# Patient Record
Sex: MALE | Race: WHITE | Employment: OTHER | URBAN - METROPOLITAN AREA
[De-identification: names, ages, dates, MRNs, and addresses within clinical notes are randomized per-mention and may not be internally consistent; named-entity substitution may affect disease eponyms.]

---

## 2019-01-21 ENCOUNTER — HOSPITAL ENCOUNTER (OUTPATIENT)
Age: 74
Discharge: HOME | End: 2019-01-23
Payer: MEDICARE

## 2019-01-21 LAB — PROSTATE SPECIFIC ANTIGEN: 2.67 NG/ML (ref 0–4)

## 2019-01-21 PROCEDURE — 84153 ASSAY OF PSA TOTAL: CPT

## 2019-02-25 ENCOUNTER — HOSPITAL ENCOUNTER (OUTPATIENT)
Age: 74
Discharge: HOME | End: 2019-02-27
Payer: MEDICARE

## 2019-02-25 LAB
PROSTATE SPECIFIC ANTIGEN: 2.99 NG/ML (ref 0–4)
URIC ACID, SERUM: 5.7 MG/DL (ref 3.4–7)

## 2019-02-25 PROCEDURE — 84550 ASSAY OF BLOOD/URIC ACID: CPT

## 2019-02-25 PROCEDURE — 84153 ASSAY OF PSA TOTAL: CPT

## 2019-05-20 ENCOUNTER — HOSPITAL ENCOUNTER (OUTPATIENT)
Age: 74
Discharge: HOME | End: 2019-05-22
Payer: MEDICARE

## 2019-05-20 LAB — PROSTATE SPECIFIC ANTIGEN: 2.68 NG/ML (ref 0–4)

## 2019-05-20 PROCEDURE — 84153 ASSAY OF PSA TOTAL: CPT

## 2019-09-18 ENCOUNTER — HOSPITAL ENCOUNTER (OUTPATIENT)
Dept: ULTRASOUND IMAGING | Age: 74
Discharge: HOME | End: 2019-09-20
Payer: MEDICARE

## 2019-09-18 DIAGNOSIS — G89.21 CHRONIC PAIN AFTER TRAUMATIC INJURY: ICD-10-CM

## 2019-09-18 DIAGNOSIS — T14.90XA TRAUMA: ICD-10-CM

## 2019-09-18 DIAGNOSIS — M79.89 RIGHT LEG SWELLING: ICD-10-CM

## 2019-09-18 DIAGNOSIS — M79.604 RIGHT LEG PAIN: ICD-10-CM

## 2019-09-18 PROCEDURE — 93971 EXTREMITY STUDY: CPT

## 2019-11-11 ENCOUNTER — HOSPITAL ENCOUNTER (OUTPATIENT)
Age: 74
Discharge: HOME | End: 2019-11-13
Payer: MEDICARE

## 2019-11-11 PROCEDURE — G0103 PSA SCREENING: HCPCS

## 2019-11-12 LAB — PROSTATE SPECIFIC ANTIGEN: 4 NG/ML (ref 0–4)

## 2020-05-09 ENCOUNTER — HOSPITAL ENCOUNTER (OUTPATIENT)
Age: 75
Discharge: HOME | End: 2020-05-11
Payer: MEDICARE

## 2020-05-09 PROCEDURE — U0003 INFECTIOUS AGENT DETECTION BY NUCLEIC ACID (DNA OR RNA); SEVERE ACUTE RESPIRATORY SYNDROME CORONAVIRUS 2 (SARS-COV-2) (CORONAVIRUS DISEASE [COVID-19]), AMPLIFIED PROBE TECHNIQUE, MAKING USE OF HIGH THROUGHPUT TECHNOLOGIES AS DESCRIBED BY CMS-2020-01-R: HCPCS

## 2020-05-13 LAB
SARS-COV-2: NOT DETECTED
SOURCE: NORMAL

## 2020-08-03 ENCOUNTER — HOSPITAL ENCOUNTER (OUTPATIENT)
Age: 75
Discharge: HOME | End: 2020-08-05
Payer: MEDICARE

## 2020-08-03 PROCEDURE — 84153 ASSAY OF PSA TOTAL: CPT

## 2020-08-03 PROCEDURE — G0103 PSA SCREENING: HCPCS

## 2020-08-13 LAB
PROSTATE SPECIFIC ANTIGEN: 4.85 NG/ML (ref 0–4)
PROSTATE SPECIFIC ANTIGEN: ABNORMAL NG/ML (ref 0–4)

## 2020-11-20 ENCOUNTER — HOSPITAL ENCOUNTER (EMERGENCY)
Age: 75
Discharge: HOME | End: 2020-11-21
Attending: EMERGENCY MEDICINE
Payer: MEDICARE

## 2020-11-20 ENCOUNTER — APPOINTMENT (OUTPATIENT)
Dept: GENERAL RADIOLOGY | Age: 75
End: 2020-11-20
Payer: MEDICARE

## 2020-11-20 VITALS
HEIGHT: 74 IN | OXYGEN SATURATION: 96 % | SYSTOLIC BLOOD PRESSURE: 152 MMHG | WEIGHT: 240 LBS | HEART RATE: 75 BPM | DIASTOLIC BLOOD PRESSURE: 74 MMHG | TEMPERATURE: 98.5 F | BODY MASS INDEX: 30.8 KG/M2 | RESPIRATION RATE: 18 BRPM

## 2020-11-20 LAB
ALBUMIN SERPL-MCNC: 3.6 G/DL (ref 3.5–5.2)
ALP BLD-CCNC: 60 U/L (ref 40–129)
ALT SERPL-CCNC: 9 U/L (ref 0–40)
ANION GAP SERPL CALCULATED.3IONS-SCNC: 10 MMOL/L (ref 7–16)
AST SERPL-CCNC: 20 U/L (ref 0–39)
BASOPHILS ABSOLUTE: 0.01 E9/L (ref 0–0.2)
BASOPHILS RELATIVE PERCENT: 0.2 % (ref 0–2)
BILIRUB SERPL-MCNC: 0.7 MG/DL (ref 0–1.2)
BUN BLDV-MCNC: 12 MG/DL (ref 8–23)
CALCIUM SERPL-MCNC: 8 MG/DL (ref 8.6–10.2)
CHLORIDE BLD-SCNC: 98 MMOL/L (ref 98–107)
CO2: 25 MMOL/L (ref 22–29)
CREAT SERPL-MCNC: 1.1 MG/DL (ref 0.7–1.2)
D DIMER: <200 NG/ML DDU
EOSINOPHILS ABSOLUTE: 0.03 E9/L (ref 0.05–0.5)
EOSINOPHILS RELATIVE PERCENT: 0.6 % (ref 0–6)
GFR AFRICAN AMERICAN: >60
GFR NON-AFRICAN AMERICAN: >60 ML/MIN/1.73
GLUCOSE BLD-MCNC: 106 MG/DL (ref 74–99)
HCT VFR BLD CALC: 41.1 % (ref 37–54)
HEMOGLOBIN: 13.8 G/DL (ref 12.5–16.5)
IMMATURE GRANULOCYTES #: 0.02 E9/L
IMMATURE GRANULOCYTES %: 0.4 % (ref 0–5)
LYMPHOCYTES ABSOLUTE: 0.69 E9/L (ref 1.5–4)
LYMPHOCYTES RELATIVE PERCENT: 14.6 % (ref 20–42)
MCH RBC QN AUTO: 30.3 PG (ref 26–35)
MCHC RBC AUTO-ENTMCNC: 33.6 % (ref 32–34.5)
MCV RBC AUTO: 90.1 FL (ref 80–99.9)
MONOCYTES ABSOLUTE: 0.5 E9/L (ref 0.1–0.95)
MONOCYTES RELATIVE PERCENT: 10.6 % (ref 2–12)
NEUTROPHILS ABSOLUTE: 3.48 E9/L (ref 1.8–7.3)
NEUTROPHILS RELATIVE PERCENT: 73.6 % (ref 43–80)
PDW BLD-RTO: 12.7 FL (ref 11.5–15)
PLATELET # BLD: 187 E9/L (ref 130–450)
PMV BLD AUTO: 10.3 FL (ref 7–12)
POTASSIUM REFLEX MAGNESIUM: 4 MMOL/L (ref 3.5–5)
RBC # BLD: 4.56 E12/L (ref 3.8–5.8)
SODIUM BLD-SCNC: 133 MMOL/L (ref 132–146)
TOTAL PROTEIN: 6.1 G/DL (ref 6.4–8.3)
TROPONIN: <0.01 NG/ML (ref 0–0.03)
WBC # BLD: 4.7 E9/L (ref 4.5–11.5)

## 2020-11-20 PROCEDURE — 93005 ELECTROCARDIOGRAM TRACING: CPT

## 2020-11-20 PROCEDURE — 85025 COMPLETE CBC W/AUTO DIFF WBC: CPT

## 2020-11-20 PROCEDURE — 80053 COMPREHEN METABOLIC PANEL: CPT

## 2020-11-20 PROCEDURE — 99283 EMERGENCY DEPT VISIT LOW MDM: CPT

## 2020-11-20 PROCEDURE — 2580000003 HC RX 258: Performed by: EMERGENCY MEDICINE

## 2020-11-20 PROCEDURE — 71045 X-RAY EXAM CHEST 1 VIEW: CPT

## 2020-11-20 PROCEDURE — 85378 FIBRIN DEGRADE SEMIQUANT: CPT

## 2020-11-20 PROCEDURE — 82728 ASSAY OF FERRITIN: CPT

## 2020-11-20 PROCEDURE — 85651 RBC SED RATE NONAUTOMATED: CPT

## 2020-11-20 PROCEDURE — 84484 ASSAY OF TROPONIN QUANT: CPT

## 2020-11-20 PROCEDURE — 86140 C-REACTIVE PROTEIN: CPT

## 2020-11-21 LAB
C-REACTIVE PROTEIN: 3.4 MG/DL (ref 0–0.4)
FERRITIN: 192 NG/ML
SEDIMENTATION RATE, ERYTHROCYTE: 13 MM/HR (ref 0–15)

## 2020-11-21 RX ORDER — ALBUTEROL SULFATE 90 UG/1
2 AEROSOL, METERED RESPIRATORY (INHALATION) 4 TIMES DAILY PRN
Qty: 1 INHALER | Refills: 0 | Status: SHIPPED | OUTPATIENT
Start: 2020-11-21 | End: 2020-12-10 | Stop reason: CLARIF

## 2020-11-21 ASSESSMENT — ENCOUNTER SYMPTOMS
SORE THROAT: 0
COUGH: 0
EYE DISCHARGE: 0
SINUS PRESSURE: 0
EYE PAIN: 0
ABDOMINAL PAIN: 0
VOMITING: 0
DIARRHEA: 0
NAUSEA: 0
EYE REDNESS: 0
WHEEZING: 0
BACK PAIN: 0
SHORTNESS OF BREATH: 0

## 2020-11-21 NOTE — ED NOTES
LPN walking pt to room with pulse ox, pulse ox maintained pulse ox of 94% RA and pulse of [facility]     Art Echavarria RN  11/20/20 9964

## 2020-11-21 NOTE — ED PROVIDER NOTES
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 History comes primarily from the patient. He states that he was diagnosed last week, and has had a significant worsening of his fatigue, myalgias, arthralgias since that time. He denies any significant shortness of breath, however as his symptoms are worsening he contacted a colleague, who advised that he present to the emergency department for further evaluation and treatment. On arrival, he is assessed with history, physical exam, imaging studies, laboratory studies, EKG, vital signs. His vital signs are stable on arrival, his O2 sat was 96% on room air, and he was afebrile. He was ambulated shortly after admission and his O2 saturation did not drop below 96% with ambulation. Review of Systems   Constitutional: Positive for activity change, appetite change, chills, fatigue and unexpected weight change (Patient endorses 9 pounds of weight loss secondary to decreased oral intake). Negative for fever. HENT: Negative for ear pain, sinus pressure and sore throat. Eyes: Negative for pain, discharge and redness. Respiratory: Negative for cough, shortness of breath and wheezing. Cardiovascular: Negative for chest pain. Gastrointestinal: Negative for abdominal pain, diarrhea, nausea and vomiting. Genitourinary: Negative for dysuria and frequency. Musculoskeletal: Positive for arthralgias and myalgias. Negative for back pain. Skin: Negative for rash and wound. Neurological: Negative for weakness and headaches. Hematological: Negative for adenopathy. All other systems reviewed and are negative. Physical Exam  Constitutional:       General: He is not in acute distress. Appearance: He is well-developed. He is not ill-appearing or diaphoretic. HENT:      Head: Normocephalic and atraumatic. Mouth/Throat:      Dentition: Abnormal dentition. Eyes:      Pupils: Pupils are equal, round, and reactive to light. Neck:      Musculoskeletal: Normal range of motion. Vascular: No JVD. Trachea: No tracheal deviation. Cardiovascular:      Rate and Rhythm: Regular rhythm. Heart sounds: No murmur. No friction rub. No gallop. Pulmonary:      Effort: Pulmonary effort is normal. No respiratory distress. Breath sounds: Normal breath sounds. No stridor. No wheezing or rales. Chest:      Chest wall: No tenderness. Abdominal:      General: Bowel sounds are normal. There is no distension. Palpations: Abdomen is soft. Tenderness: There is no abdominal tenderness. There is no guarding. Musculoskeletal: Normal range of motion. Skin:     General: Skin is warm and dry. Neurological:      Mental Status: He is alert. Cranial Nerves: No cranial nerve deficit. Psychiatric:         Behavior: Behavior normal.          Procedures     MDM  Number of Diagnoses or Management Options  Diagnosis management comments: The patient's emergency department work-up including history, physical exam, vital signs, laboratory studies, imaging studies and reevaluation after initial treatment are reassuring for discharge to home with close outpatient follow-up. Specifically, Dr. Juan Zuniga assessment was negative for any significant laboratory abnormality, his O2 saturation remained in the mid to high 90s throughout the entirety of his emergency department stay both with rest and ambulation, the remainder of his vital signs remained stable, and he was considered stable for discharge to home for convalescence. They were advised to return to the emergency department should they develop fever, chills, night sweats, nausea, vomiting, diarrhea, chest pain, shortness of breath, or worsening of their symptoms despite treatment from this emergency department visit. They were instructed to follow-up with their primary care provider in 2 days.  This information was relayed to the patient who understood this plan of care and was amenable to the plan.            --------------------------------------------- PAST HISTORY ---------------------------------------------  Past Medical History:  has a past medical history of BPH (benign prostatic hyperplasia), Cardiomyopathy ([facility] [address] Hyperlipidemia, Hypertension, Left ventricular diastolic dysfunction, and Syncope and collapse. Past Surgical History:  has no past surgical history on file. Social History:  reports that he has never smoked. He has never used smokeless tobacco. He reports current alcohol use. He reports that he does not use drugs. Family History: family history is not on file. The patients home medications have been reviewed. Allergies: Patient has no known allergies.     -------------------------------------------------- RESULTS -------------------------------------------------  Labs:  Results for orders placed or performed during the hospital encounter of 11/20/20   CBC Auto Differential   Result Value Ref Range    WBC 4.7 4.5 - 11.5 E9/L    RBC 4.56 3.80 - 5.80 E12/L    Hemoglobin 13.8 12.5 - 16.5 g/dL    Hematocrit 41.1 37.0 - 54.0 %    MCV 90.1 80.0 - 99.9 fL    MCH 30.3 26.0 - 35.0 pg    MCHC 33.6 32.0 - 34.5 %    RDW 12.7 11.5 - 15.0 fL    Platelets 724 680 - 755 E9/L    MPV 10.3 7.0 - 12.0 fL    Neutrophils % 73.6 43.0 - 80.0 %    Immature Granulocytes % 0.4 0.0 - 5.0 %    Lymphocytes % 14.6 (L) 20.0 - 42.0 %    Monocytes % 10.6 2.0 - 12.0 %    Eosinophils % 0.6 0.0 - 6.0 %    Basophils % 0.2 0.0 - 2.0 %    Neutrophils Absolute 3.48 1.80 - 7.30 E9/L    Immature Granulocytes # 0.02 E9/L    Lymphocytes Absolute 0.69 (L) 1.50 - 4.00 E9/L    Monocytes Absolute 0.50 0.10 - 0.95 E9/L    Eosinophils Absolute 0.03 (L) 0.05 - 0.50 E9/L    Basophils Absolute 0.01 0.00 - 0.20 E9/L   Comprehensive Metabolic Panel w/ Reflex to MG   Result Value Ref Range    Sodium 133 132 - 146 mmol/L    Potassium reflex Magnesium 4.0 3.5 - 5.0 mmol/L    Chloride 98 98 - 107 mmol/L    CO2 25 22 - 29 mmol/L    Anion Gap 10 7 - 16 mmol/L    Glucose 106 (H) 74 - 99 mg/dL    BUN 12 8 - 23 mg/dL    CREATININE 1.1 0.7 - 1.2 mg/dL    GFR Non-African American >60 >=60 mL/min/1.73    GFR African American >60     Calcium 8.0 (L) 8.6 - 10.2 mg/dL    Total Protein 6.1 (L) 6.4 - 8.3 g/dL    Alb 3.6 3.5 - 5.2 g/dL    Total Bilirubin 0.7 0.0 - 1.2 mg/dL    Alkaline Phosphatase 60 40 - 129 U/L    ALT 9 0 - 40 U/L    AST 20 0 - 39 U/L   Sedimentation Rate   Result Value Ref Range    Sed Rate 13 0 - 15 mm/Hr   Troponin   Result Value Ref Range    Troponin <0.01 0.00 - 0.03 ng/mL   D-dimer, quantitative   Result Value Ref Range    D-Dimer, Quant <200 ng/mL DDU       Radiology:  XR CHEST PORTABLE   Final Result   No acute process. ------------------------- NURSING NOTES AND VITALS REVIEWED ---------------------------  Date / Time Roomed:  11/20/2020  9:49 PM  ED Bed Assignment:  06/06    The nursing notes within the ED encounter and vital signs as below have been reviewed. BP (!) 152/74   Pulse 75   Temp 98.5 °F (36.9 °C) (Oral)   Resp 18   Ht 6' 2\" (1.88 m)   Wt 240 lb (108.9 kg)   SpO2 96%   BMI 30.81 kg/m²   Oxygen Saturation Interpretation: Normal      ------------------------------------------ PROGRESS NOTES ------------------------------------------  11:48 PM EST  I have spoken with the patient and discussed todays results, in addition to providing specific details for the plan of care and counseling regarding the diagnosis and prognosis. Their questions are answered at this time and they are agreeable with the plan. I discussed at length with them reasons for immediate return here for re evaluation.  They will followup with their primary care physician by calling their office on Monday.      --------------------------------- ADDITIONAL PROVIDER NOTES ---------------------------------  At this time the patient is without objective evidence of an acute process requiring hospitalization or inpatient management. They have remained hemodynamically stable throughout their entire ED visit and are stable for discharge with outpatient follow-up. The plan has been discussed in detail and they are aware of the specific conditions for emergent return, as well as the importance of follow-up. Discharge Medication List as of 11/21/2020 12:36 AM      START taking these medications    Details   albuterol sulfate HFA (VENTOLIN HFA) 108 (90 Base) MCG/ACT inhaler Inhale 2 puffs into the lungs 4 times daily as needed for Wheezing, Disp-1 Inhaler,R-0Print             Diagnosis:  1. COVID-19 virus infection        Disposition:  Patient's disposition: Discharge to home  Patient's condition is stable.                  [address]ake [address] DO  Resident  11/21/20 9645

## 2020-11-23 LAB
EKG ATRIAL RATE: 87 BPM
EKG P AXIS: 26 DEGREES
EKG P-R INTERVAL: 212 MS
EKG Q-T INTERVAL: 358 MS
EKG QRS DURATION: 88 MS
EKG QTC CALCULATION (BAZETT): 430 MS
EKG R AXIS: 26 DEGREES
EKG T AXIS: 19 DEGREES
EKG VENTRICULAR RATE: 87 BPM

## 2020-11-27 ENCOUNTER — HOSPITAL ENCOUNTER (INPATIENT)
Age: 75
LOS: 2 days | Discharge: HOME | DRG: 177 | End: 2020-11-29
Attending: EMERGENCY MEDICINE | Admitting: INTERNAL MEDICINE
Payer: MEDICARE

## 2020-11-27 ENCOUNTER — APPOINTMENT (OUTPATIENT)
Dept: GENERAL RADIOLOGY | Age: 75
DRG: 177 | End: 2020-11-27
Payer: MEDICARE

## 2020-11-27 ENCOUNTER — APPOINTMENT (OUTPATIENT)
Dept: CT IMAGING | Age: 75
DRG: 177 | End: 2020-11-27
Payer: MEDICARE

## 2020-11-27 PROBLEM — J12.82 PNEUMONIA DUE TO COVID-19 VIRUS: Status: ACTIVE | Noted: 2020-11-27

## 2020-11-27 PROBLEM — U07.1 COVID-19 VIRUS INFECTION: Status: ACTIVE | Noted: 2020-11-27

## 2020-11-27 LAB
ALBUMIN SERPL-MCNC: 3.4 G/DL (ref 3.5–5.2)
ALP BLD-CCNC: 53 U/L (ref 40–129)
ALT SERPL-CCNC: 15 U/L (ref 0–40)
ANION GAP SERPL CALCULATED.3IONS-SCNC: 15 MMOL/L (ref 7–16)
APTT: 25.3 SEC (ref 24.5–35.1)
AST SERPL-CCNC: 25 U/L (ref 0–39)
BASOPHILS ABSOLUTE: 0.01 E9/L (ref 0–0.2)
BASOPHILS RELATIVE PERCENT: 0.1 % (ref 0–2)
BILIRUB SERPL-MCNC: 0.9 MG/DL (ref 0–1.2)
BUN BLDV-MCNC: 18 MG/DL (ref 8–23)
C-REACTIVE PROTEIN: 7.3 MG/DL (ref 0–0.4)
CALCIUM SERPL-MCNC: 8.7 MG/DL (ref 8.6–10.2)
CHLORIDE BLD-SCNC: 105 MMOL/L (ref 98–107)
CO2: 21 MMOL/L (ref 22–29)
CREAT SERPL-MCNC: 1 MG/DL (ref 0.7–1.2)
D DIMER: 423 NG/ML DDU
EOSINOPHILS ABSOLUTE: 0.01 E9/L (ref 0.05–0.5)
EOSINOPHILS RELATIVE PERCENT: 0.1 % (ref 0–6)
FERRITIN: 383 NG/ML
FIBRINOGEN: 596 MG/DL (ref 225–540)
GFR AFRICAN AMERICAN: >60
GFR NON-AFRICAN AMERICAN: >60 ML/MIN/1.73
GLUCOSE BLD-MCNC: 108 MG/DL (ref 74–99)
HCT VFR BLD CALC: 42 % (ref 37–54)
HEMOGLOBIN: 13.9 G/DL (ref 12.5–16.5)
IMMATURE GRANULOCYTES #: 0.1 E9/L
IMMATURE GRANULOCYTES %: 0.8 % (ref 0–5)
INR BLD: 1.1
LACTATE DEHYDROGENASE: 284 U/L (ref 135–225)
LACTIC ACID: 1.5 MMOL/L (ref 0.5–2.2)
LYMPHOCYTES ABSOLUTE: 0.89 E9/L (ref 1.5–4)
LYMPHOCYTES RELATIVE PERCENT: 7.4 % (ref 20–42)
MCH RBC QN AUTO: 29.1 PG (ref 26–35)
MCHC RBC AUTO-ENTMCNC: 33.1 % (ref 32–34.5)
MCV RBC AUTO: 87.9 FL (ref 80–99.9)
METER GLUCOSE: 104 MG/DL (ref 74–99)
METER GLUCOSE: 115 MG/DL (ref 74–99)
MONOCYTES ABSOLUTE: 1.27 E9/L (ref 0.1–0.95)
MONOCYTES RELATIVE PERCENT: 10.5 % (ref 2–12)
NEUTROPHILS ABSOLUTE: 9.79 E9/L (ref 1.8–7.3)
NEUTROPHILS RELATIVE PERCENT: 81.1 % (ref 43–80)
PDW BLD-RTO: 12.6 FL (ref 11.5–15)
PLATELET # BLD: 324 E9/L (ref 130–450)
PMV BLD AUTO: 10 FL (ref 7–12)
POTASSIUM REFLEX MAGNESIUM: 4.3 MMOL/L (ref 3.5–5)
PROCALCITONIN: 0.13 NG/ML (ref 0–0.08)
PROTHROMBIN TIME: 12.2 SEC (ref 9.3–12.4)
RBC # BLD: 4.78 E12/L (ref 3.8–5.8)
SEDIMENTATION RATE, ERYTHROCYTE: 28 MM/HR (ref 0–15)
SODIUM BLD-SCNC: 141 MMOL/L (ref 132–146)
TOTAL PROTEIN: 6.3 G/DL (ref 6.4–8.3)
TROPONIN: <0.01 NG/ML (ref 0–0.03)
WBC # BLD: 12.1 E9/L (ref 4.5–11.5)

## 2020-11-27 PROCEDURE — 84145 PROCALCITONIN (PCT): CPT

## 2020-11-27 PROCEDURE — 85025 COMPLETE CBC W/AUTO DIFF WBC: CPT

## 2020-11-27 PROCEDURE — 86140 C-REACTIVE PROTEIN: CPT

## 2020-11-27 PROCEDURE — 83605 ASSAY OF LACTIC ACID: CPT

## 2020-11-27 PROCEDURE — 6360000002 HC RX W HCPCS: Performed by: INTERNAL MEDICINE

## 2020-11-27 PROCEDURE — 2060000000 HC ICU INTERMEDIATE R&B

## 2020-11-27 PROCEDURE — 85384 FIBRINOGEN ACTIVITY: CPT

## 2020-11-27 PROCEDURE — 83615 LACTATE (LD) (LDH) ENZYME: CPT

## 2020-11-27 PROCEDURE — 82962 GLUCOSE BLOOD TEST: CPT

## 2020-11-27 PROCEDURE — 71045 X-RAY EXAM CHEST 1 VIEW: CPT

## 2020-11-27 PROCEDURE — 82728 ASSAY OF FERRITIN: CPT

## 2020-11-27 PROCEDURE — 71275 CT ANGIOGRAPHY CHEST: CPT

## 2020-11-27 PROCEDURE — 85378 FIBRIN DEGRADE SEMIQUANT: CPT

## 2020-11-27 PROCEDURE — 6370000000 HC RX 637 (ALT 250 FOR IP): Performed by: EMERGENCY MEDICINE

## 2020-11-27 PROCEDURE — 84154 ASSAY OF PSA FREE: CPT

## 2020-11-27 PROCEDURE — 84484 ASSAY OF TROPONIN QUANT: CPT

## 2020-11-27 PROCEDURE — 2580000003 HC RX 258: Performed by: SPECIALIST

## 2020-11-27 PROCEDURE — 85610 PROTHROMBIN TIME: CPT

## 2020-11-27 PROCEDURE — 6370000000 HC RX 637 (ALT 250 FOR IP): Performed by: INTERNAL MEDICINE

## 2020-11-27 PROCEDURE — 2580000003 HC RX 258: Performed by: INTERNAL MEDICINE

## 2020-11-27 PROCEDURE — 85651 RBC SED RATE NONAUTOMATED: CPT

## 2020-11-27 PROCEDURE — 6360000004 HC RX CONTRAST MEDICATION: Performed by: RADIOLOGY

## 2020-11-27 PROCEDURE — 99285 EMERGENCY DEPT VISIT HI MDM: CPT

## 2020-11-27 PROCEDURE — 85730 THROMBOPLASTIN TIME PARTIAL: CPT

## 2020-11-27 PROCEDURE — 2500000003 HC RX 250 WO HCPCS: Performed by: SPECIALIST

## 2020-11-27 PROCEDURE — 84153 ASSAY OF PSA TOTAL: CPT

## 2020-11-27 PROCEDURE — 2580000003 HC RX 258: Performed by: RADIOLOGY

## 2020-11-27 PROCEDURE — 80053 COMPREHEN METABOLIC PANEL: CPT

## 2020-11-27 PROCEDURE — XW033E5 INTRODUCTION OF REMDESIVIR ANTI-INFECTIVE INTO PERIPHERAL VEIN, PERCUTANEOUS APPROACH, NEW TECHNOLOGY GROUP 5: ICD-10-PCS | Performed by: EMERGENCY MEDICINE

## 2020-11-27 RX ORDER — METOPROLOL SUCCINATE 25 MG/1
25 TABLET, EXTENDED RELEASE ORAL DAILY
Status: DISCONTINUED | OUTPATIENT
Start: 2020-11-28 | End: 2020-11-29 | Stop reason: HOSPADM

## 2020-11-27 RX ORDER — DEXTROSE MONOHYDRATE 25 G/50ML
12.5 INJECTION, SOLUTION INTRAVENOUS PRN
Status: DISCONTINUED | OUTPATIENT
Start: 2020-11-27 | End: 2020-11-27

## 2020-11-27 RX ORDER — 0.9 % SODIUM CHLORIDE 0.9 %
30 INTRAVENOUS SOLUTION INTRAVENOUS PRN
Status: DISCONTINUED | OUTPATIENT
Start: 2020-11-27 | End: 2020-11-29 | Stop reason: HOSPADM

## 2020-11-27 RX ORDER — ASPIRIN 81 MG/1
81 TABLET ORAL DAILY
Status: DISCONTINUED | OUTPATIENT
Start: 2020-11-27 | End: 2020-11-29 | Stop reason: HOSPADM

## 2020-11-27 RX ORDER — CHOLECALCIFEROL (VITAMIN D3) 50 MCG
6000 TABLET ORAL DAILY
Status: DISCONTINUED | OUTPATIENT
Start: 2020-11-27 | End: 2020-11-29 | Stop reason: HOSPADM

## 2020-11-27 RX ORDER — SODIUM CHLORIDE 0.9 % (FLUSH) 0.9 %
10 SYRINGE (ML) INJECTION ONCE
Status: COMPLETED | OUTPATIENT
Start: 2020-11-27 | End: 2020-11-27

## 2020-11-27 RX ORDER — NICOTINE POLACRILEX 4 MG
15 LOZENGE BUCCAL PRN
Status: DISCONTINUED | OUTPATIENT
Start: 2020-11-27 | End: 2020-11-27

## 2020-11-27 RX ORDER — METOPROLOL SUCCINATE 25 MG/1
25 TABLET, EXTENDED RELEASE ORAL DAILY
COMMUNITY

## 2020-11-27 RX ORDER — ZOLPIDEM TARTRATE 5 MG/1
5 TABLET ORAL NIGHTLY PRN
Status: DISCONTINUED | OUTPATIENT
Start: 2020-11-27 | End: 2020-11-29 | Stop reason: HOSPADM

## 2020-11-27 RX ORDER — ACETAMINOPHEN 650 MG/1
650 SUPPOSITORY RECTAL EVERY 6 HOURS PRN
Status: DISCONTINUED | OUTPATIENT
Start: 2020-11-27 | End: 2020-11-29 | Stop reason: HOSPADM

## 2020-11-27 RX ORDER — PANTOPRAZOLE SODIUM 40 MG/1
40 TABLET, DELAYED RELEASE ORAL
Status: DISCONTINUED | OUTPATIENT
Start: 2020-11-28 | End: 2020-11-29 | Stop reason: HOSPADM

## 2020-11-27 RX ORDER — TAMSULOSIN HYDROCHLORIDE 0.4 MG/1
0.4 CAPSULE ORAL 2 TIMES DAILY
Status: DISCONTINUED | OUTPATIENT
Start: 2020-11-27 | End: 2020-11-29 | Stop reason: HOSPADM

## 2020-11-27 RX ORDER — ACETAMINOPHEN 650 MG/1
650 SUPPOSITORY RECTAL EVERY 6 HOURS PRN
Status: DISCONTINUED | OUTPATIENT
Start: 2020-11-27 | End: 2020-11-27 | Stop reason: SDUPTHER

## 2020-11-27 RX ORDER — ATORVASTATIN CALCIUM 10 MG/1
10 TABLET, FILM COATED ORAL DAILY
Status: DISCONTINUED | OUTPATIENT
Start: 2020-11-27 | End: 2020-11-29 | Stop reason: HOSPADM

## 2020-11-27 RX ORDER — PROMETHAZINE HYDROCHLORIDE 25 MG/1
12.5 TABLET ORAL EVERY 6 HOURS PRN
Status: DISCONTINUED | OUTPATIENT
Start: 2020-11-27 | End: 2020-11-29 | Stop reason: HOSPADM

## 2020-11-27 RX ORDER — ONDANSETRON 2 MG/ML
4 INJECTION INTRAMUSCULAR; INTRAVENOUS EVERY 6 HOURS PRN
Status: DISCONTINUED | OUTPATIENT
Start: 2020-11-27 | End: 2020-11-29 | Stop reason: HOSPADM

## 2020-11-27 RX ORDER — DEXAMETHASONE 4 MG/1
6 TABLET ORAL EVERY 12 HOURS SCHEDULED
Status: DISCONTINUED | OUTPATIENT
Start: 2020-11-27 | End: 2020-11-29 | Stop reason: HOSPADM

## 2020-11-27 RX ORDER — POLYETHYLENE GLYCOL 3350 17 G/17G
17 POWDER, FOR SOLUTION ORAL DAILY PRN
Status: DISCONTINUED | OUTPATIENT
Start: 2020-11-27 | End: 2020-11-29 | Stop reason: HOSPADM

## 2020-11-27 RX ORDER — DEXTROSE MONOHYDRATE 50 MG/ML
100 INJECTION, SOLUTION INTRAVENOUS PRN
Status: DISCONTINUED | OUTPATIENT
Start: 2020-11-27 | End: 2020-11-27

## 2020-11-27 RX ORDER — SODIUM CHLORIDE 0.9 % (FLUSH) 0.9 %
10 SYRINGE (ML) INJECTION PRN
Status: DISCONTINUED | OUTPATIENT
Start: 2020-11-27 | End: 2020-11-29 | Stop reason: HOSPADM

## 2020-11-27 RX ORDER — ACETAMINOPHEN 325 MG/1
650 TABLET ORAL EVERY 6 HOURS PRN
Status: DISCONTINUED | OUTPATIENT
Start: 2020-11-27 | End: 2020-11-29 | Stop reason: HOSPADM

## 2020-11-27 RX ORDER — SODIUM CHLORIDE 0.9 % (FLUSH) 0.9 %
10 SYRINGE (ML) INJECTION EVERY 12 HOURS SCHEDULED
Status: DISCONTINUED | OUTPATIENT
Start: 2020-11-27 | End: 2020-11-29 | Stop reason: HOSPADM

## 2020-11-27 RX ORDER — OMEPRAZOLE 40 MG/1
40 CAPSULE, DELAYED RELEASE ORAL DAILY PRN
COMMUNITY

## 2020-11-27 RX ORDER — LOSARTAN POTASSIUM 25 MG/1
12.5 TABLET ORAL DAILY
Status: DISCONTINUED | OUTPATIENT
Start: 2020-11-27 | End: 2020-11-29 | Stop reason: HOSPADM

## 2020-11-27 RX ORDER — ACETAMINOPHEN 325 MG/1
650 TABLET ORAL EVERY 6 HOURS PRN
Status: DISCONTINUED | OUTPATIENT
Start: 2020-11-27 | End: 2020-11-27 | Stop reason: SDUPTHER

## 2020-11-27 RX ORDER — DEXAMETHASONE 6 MG/1
6 TABLET ORAL DAILY
Status: ON HOLD | COMMUNITY
Start: 2020-11-23 | End: 2020-11-29 | Stop reason: SDUPTHER

## 2020-11-27 RX ADMIN — LOSARTAN POTASSIUM 12.5 MG: 25 TABLET, FILM COATED ORAL at 18:13

## 2020-11-27 RX ADMIN — IOPAMIDOL 75 ML: 755 INJECTION, SOLUTION INTRAVENOUS at 15:39

## 2020-11-27 RX ADMIN — Medication 10 ML: at 23:50

## 2020-11-27 RX ADMIN — DEXAMETHASONE 6 MG: 4 TABLET ORAL at 21:13

## 2020-11-27 RX ADMIN — ASPIRIN 81 MG: 81 TABLET, COATED ORAL at 21:10

## 2020-11-27 RX ADMIN — REMDESIVIR 200 MG: 100 INJECTION, POWDER, LYOPHILIZED, FOR SOLUTION INTRAVENOUS at 18:19

## 2020-11-27 RX ADMIN — ATORVASTATIN CALCIUM 10 MG: 20 TABLET, FILM COATED ORAL at 21:09

## 2020-11-27 RX ADMIN — ZOLPIDEM TARTRATE 5 MG: 5 TABLET ORAL at 23:48

## 2020-11-27 RX ADMIN — ENOXAPARIN SODIUM 40 MG: 40 INJECTION SUBCUTANEOUS at 18:15

## 2020-11-27 RX ADMIN — TAMSULOSIN HYDROCHLORIDE 0.4 MG: 0.4 CAPSULE ORAL at 21:09

## 2020-11-27 RX ADMIN — SODIUM CHLORIDE, PRESERVATIVE FREE 10 ML: 5 INJECTION INTRAVENOUS at 18:23

## 2020-11-27 RX ADMIN — Medication 6000 UNITS: at 18:13

## 2020-11-27 RX ADMIN — ACETAMINOPHEN 650 MG: 325 TABLET ORAL at 12:07

## 2020-11-27 ASSESSMENT — PAIN SCALES - GENERAL
PAINLEVEL_OUTOF10: 0

## 2020-11-27 NOTE — ED PROVIDER NOTES
Department of Emergency Medicine   ED  Provider Note  Admit Date/RoomTime: 11/27/2020 11:24 AM  ED Room: 13/13          History of Present Illness:  11/27/20, Time: 11:54 AM EST  Chief Complaint   Patient presents with    Concern For COVID-19     test positive 1 week ago, still feels sick, not eating or drinking well. States he lost 12#. Rosalie Chavez is a 76 y.o. male presenting to the ED for shortness of breath and covid-19 infection, beginning almost 2 weeks ago. The complaint has been persistent, moderate in severity, and worsened by exertion. Patient reports he was diagnosed with coronavirus over a week ago, he reports that he feels sick and is having trouble eating and drinking. He is lost almost 12 pounds. He reports that he is exertionally fatigued and exertionally dyspneic. He denies chest pain. No orthopnea. No diarrhea. He reports positive sick contacts. He reports his wife is also sick with coronavirus. 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 Patient was already taking Decadron at home    Review of Systems:   A complete review of systems was performed and pertinent positives and negatives are stated within HPI, all other systems reviewed and are negative.        --------------------------------------------- PAST HISTORY ---------------------------------------------  Past Medical History:  has a past medical history of BPH (benign prostatic hyperplasia), Cardiomyopathy ([facility] [address] Hyperlipidemia, Hypertension, Left ventricular diastolic dysfunction, and Syncope and collapse. Past Surgical History:  has no past surgical history on file. Social History:  reports that he has never smoked. He has never used smokeless tobacco. He reports current alcohol use. He reports that he does not use drugs. Family History: family history is not on file. . Unless otherwise noted, family history is non contributory    The patients home medications have been reviewed.     Allergies: Patient has no known allergies. I have reviewed the past medical history, past surgical history, social history, and family history    ---------------------------------------------------PHYSICAL EXAM--------------------------------------    Constitutional/General: Alert and oriented x3  Head: Normocephalic and atraumatic  Eyes: PERRL, EOMI, sclera non icteric  Mouth: Oropharynx clear, handling secretions  Neck: Supple, full ROM, no stridor, no meningeal signs  Respiratory: Lungs crackles bilaterally at the bases. Not in respiratory distress  Cardiovascular:  Regular rate. Regular rhythm. No murmurs, no aortic murmurs, no gallops, no rubs. 2+ distal pulses. Gastrointestinal:  Abdomen Soft, Non tender, Non distended. Musculoskeletal: Moves all extremities x 4. Warm and well perfused, no clubbing, no cyanosis, no edema. Capillary refill <3 seconds  Skin: skin warm and dry. No rashes. Neurologic: GCS 15, no focal deficits          -------------------------------------------------- RESULTS -------------------------------------------------  I have personally reviewed all laboratory and imaging results for this patient. Results are listed below.      LABS: (Lab results interpreted by me)  Results for orders placed or performed during the hospital encounter of 11/27/20   Procalcitonin   Result Value Ref Range    Procalcitonin 0.13 (H) 0.00 - 0.08 ng/mL   Fibrinogen   Result Value Ref Range    Fibrinogen 596 (H) 225 - 540 mg/dL   APTT   Result Value Ref Range    aPTT 25.3 24.5 - 35.1 sec   Protime-INR   Result Value Ref Range    Protime 12.2 9.3 - 12.4 sec    INR 1.1    Comprehensive Metabolic Panel w/ Reflex to MG   Result Value Ref Range    Sodium 141 132 - 146 mmol/L    Potassium reflex Magnesium 4.3 3.5 - 5.0 mmol/L    Chloride 105 98 - 107 mmol/L    CO2 21 (L) 22 - 29 mmol/L    Anion Gap 15 7 - 16 mmol/L    Glucose 108 (H) 74 - 99 mg/dL    BUN 18 8 - 23 mg/dL    CREATININE 1.0 0.7 - 1.2 mg/dL    GFR Non-African American >60 >=60 mL/min/1.73    GFR African American >60     Calcium 8.7 8.6 - 10.2 mg/dL    Total Protein 6.3 (L) 6.4 - 8.3 g/dL    Alb 3.4 (L) 3.5 - 5.2 g/dL    Total Bilirubin 0.9 0.0 - 1.2 mg/dL    Alkaline Phosphatase 53 40 - 129 U/L    ALT 15 0 - 40 U/L    AST 25 0 - 39 U/L   CBC Auto Differential   Result Value Ref Range    WBC 12.1 (H) 4.5 - 11.5 E9/L    RBC 4.78 3.80 - 5.80 E12/L    Hemoglobin 13.9 12.5 - 16.5 g/dL    Hematocrit 42.0 37.0 - 54.0 %    MCV 87.9 80.0 - 99.9 fL    MCH 29.1 26.0 - 35.0 pg    MCHC 33.1 32.0 - 34.5 %    RDW 12.6 11.5 - 15.0 fL    Platelets 592 783 - 353 E9/L    MPV 10.0 7.0 - 12.0 fL    Neutrophils % 81.1 (H) 43.0 - 80.0 %    Immature Granulocytes % 0.8 0.0 - 5.0 %    Lymphocytes % 7.4 (L) 20.0 - 42.0 %    Monocytes % 10.5 2.0 - 12.0 %    Eosinophils % 0.1 0.0 - 6.0 %    Basophils % 0.1 0.0 - 2.0 %    Neutrophils Absolute 9.79 (H) 1.80 - 7.30 E9/L    Immature Granulocytes # 0.10 E9/L    Lymphocytes Absolute 0.89 (L) 1.50 - 4.00 E9/L    Monocytes Absolute 1.27 (H) 0.10 - 0.95 E9/L    Eosinophils Absolute 0.01 (L) 0.05 - 0.50 E9/L    Basophils Absolute 0.01 0.00 - 0.20 E9/L   C-Reactive Protein   Result Value Ref Range    CRP 7.3 (H) 0.0 - 0.4 mg/dL   Lactate Dehydrogenase   Result Value Ref Range     (H) 135 - 225 U/L   Ferritin   Result Value Ref Range    Ferritin 383 ng/mL   D-Dimer, Quantitative   Result Value Ref Range    D-Dimer, Quant 423 ng/mL DDU   Troponin   Result Value Ref Range    Troponin <0.01 0.00 - 0.03 ng/mL   Lactic Acid, Plasma   Result Value Ref Range    Lactic Acid 1.5 0.5 - 2.2 mmol/L   Sedimentation Rate   Result Value Ref Range    Sed Rate 28 (H) 0 - 15 mm/Hr   ,       RADIOLOGY:  Interpreted by Radiologist unless otherwise specified  XR CHEST PORTABLE   Final Result   Cardiomegaly. Bilateral pneumonia.       CTA PULMONARY W CONTRAST    (Results Pending)          ------------------------- NURSING NOTES AND VITALS REVIEWED ---------------------------   The range)   dexamethasone (DECADRON) tablet 6 mg (has no administration in time range)   aspirin EC tablet 81 mg (has no administration in time range)   atorvastatin (LIPITOR) tablet 10 mg (has no administration in time range)   metoprolol succinate (TOPROL XL) extended release tablet 25 mg (has no administration in time range)   losartan (COZAAR) tablet 12.5 mg (has no administration in time range)   pantoprazole (PROTONIX) tablet 40 mg (has no administration in time range)   tamsulosin (FLOMAX) capsule 0.4 mg (has no administration in time range)   sodium chloride flush 0.9 % injection 10 mL (has no administration in time range)   iopamidol (ISOVUE-370) 76 % injection 75 mL (75 mLs Intravenous Given 11/27/20 9179)              I, Dr. Sherrill Morrissey, am the primary provider of record    Medical Decision Making:   covid-19 infection  Evaluated by VINI Evans started at his request      Oxygen Saturation Interpretation: 94 % on RA. Normal      Re-Evaluations:       No change      This patient's ED course included: a personal history and physicial examination, re-evaluation prior to disposition, multiple bedside re-evaluations, IV medications, cardiac monitoring, continuous pulse oximetry and complex medical decision making and emergency management    This patient has remained hemodynamically stable during their ED course. Consultations:  Dr. Nancy Frederick    Counseling: The emergency provider has spoken with the patient and discussed todays results, in addition to providing specific details for the plan of care and counseling regarding the diagnosis and prognosis.   Questions are answered at this time and they are agreeable with the plan.       --------------------------------- IMPRESSION AND DISPOSITION ---------------------------------    IMPRESSION  1. COVID-19 virus infection        DISPOSITION  Disposition: Admit to telemetry  Patient condition is stable        NOTE: This report was transcribed using voice recognition software.  Every effort was made to ensure accuracy; however, inadvertent computerized transcription errors may be present     Hernandez Neal MD  11/27/20 3649

## 2020-11-27 NOTE — H&P
[address] [facility]  History and Physical      CHIEF COMPLAINT:    Chief Complaint   Patient presents with    Concern For COVID-19     test positive 1 week ago, still feels sick, not eating or drinking well. States he lost 12#. Patient of Samina Walker MD presents with:  Pneumonia due to COVID-19 virus    History of Present Illness:   Dr. Nuha Bingham states he has actually felt pretty sick for about 2 weeks now. 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 He reports shortness of breath and diarrhea, as well as fever in the 100s although not sure exactly how high. No other associated symptoms. No alleviating or exacerbating factors. Severity is mild. Quality is none. He was feeling just nonspecifically sicker today and was advised by infectious disease to come into the ER. In the ER he was found to have fairly significant bilateral infiltrates and multiple lab work abnormalities, although not overtly hypoxemic, I believe the lowest he got was about 94% on room air. Past Medical History:   Diagnosis Date    BPH (benign prostatic hyperplasia)     Cardiomyopathy ([facility] [address]     Hyperlipidemia     Hypertension     Left ventricular diastolic dysfunction     Syncope and collapse          History reviewed. No pertinent surgical history. Medications Prior to Admission:    Not in a hospital admission. Note that the patient's home medications were reviewed and the above list is accurate to the best of my knowledge at the time of the exam.    Allergies:    Patient has no known allergies. Social History:    reports that he has never smoked. He has never used smokeless tobacco. He reports current alcohol use. He reports that he does not use drugs.     Family History:   Wife also has [phone] although doing okay at home    REVIEW OF SYSTEMS:  As above in the HPI, otherwise negative    PHYSICAL EXAM:    Vitals:  BP (!) 161/82 Pulse 53   Temp 97.7 °F (36.5 °C) (Temporal)   Resp 19   Ht 6' 2\" (1.88 m)   Wt 240 lb (108.9 kg)   SpO2 95%   BMI 30.81 kg/m²       General appearance: NAD, conversant, overall comfortable appearing  Eyes: Sclerae anicteric  HEENT: AT/NC, MMM  Neck: FROM, supple, no thyromegaly  Lymph: No cervical / supraclavicular lymphadenopathy  Lungs: Bilateral rales, WOB normal  CV: RRR, no MRGs, no lower extremity edema  Abdomen: Soft, non-tender; no masses or HSM, +BS  Extremities: No edema. No apparent synovitis. No clubbing or cyanosis of the hands. Skin: no rash, induration, lesions, or ulcers  Psych: Calm and cooperative. Normal judgement and insight. Normal mood and affect. Neuro: Alert and interactive, face symmetric, speech fluent. LABS:  All labs reviewed. Of note:  CBC with Differential:    Lab Results   Component Value Date    WBC 12.1 11/27/2020    RBC 4.78 11/27/2020    HGB 13.9 11/27/2020    HCT 42.0 11/27/2020     11/27/2020    MCV 87.9 11/27/2020    MCH 29.1 11/27/2020    MCHC 33.1 11/27/2020    RDW 12.6 11/27/2020    LYMPHOPCT 7.4 11/27/2020    MONOPCT 10.5 11/27/2020    BASOPCT 0.1 11/27/2020    MONOSABS 1.27 11/27/2020    LYMPHSABS 0.89 11/27/2020    EOSABS 0.01 11/27/2020    BASOSABS 0.01 11/27/2020     CMP:    Lab Results   Component Value Date     11/27/2020    K 4.3 11/27/2020     11/27/2020    CO2 21 11/27/2020    BUN 18 11/27/2020    CREATININE 1.0 11/27/2020    GFRAA >60 11/27/2020    LABGLOM >60 11/27/2020    GLUCOSE 108 11/27/2020    PROT 6.3 11/27/2020    LABALBU 3.4 11/27/2020    CALCIUM 8.7 11/27/2020    BILITOT 0.9 11/27/2020    ALKPHOS 53 11/27/2020    AST 25 11/27/2020    ALT 15 11/27/2020       Imaging:  I've personally reviewed the patient's CXR:        ASSESSMENT/PLAN:  Principal Problem:    Pneumonia due to COVID-19 virus  Resolved Problems:    * No resolved hospital problems.  *      CTA chest    Borderline O2 sats (94/RA) but CXR looks pretty abnormal    Dex/rem    ID Consult    Increased dose DVT ppx due to BMI >30    Code status: Full  Requires inpatient level of care  Cristina Hinton    2:34 PM  11/27/2020  Cell: [phone]

## 2020-11-27 NOTE — LETTER
Beneficiary Notification Letter  BPCI Advanced     Your Doctor or [address],    We wanted to let you know that your health care provider, [address], has volunteered to take part in our [facility] & Medicaid Services (CMS) Bundled Payments for [facility] (BPCI Advanced). This doesnt change your Medicare rights or benefits and you dont need to do anything. What are bundled payments? A bundled payment combines, or bundles together, payments that Medicare makes to your health care providers for the many different kinds of medical services you might get in a specific time period. In BPCI Advanced, this time period could include a hospital inpatient stay or outpatient procedure, plus 90 days. Why would Medicare bundle payments? Bundled payments are thought of as a value-based way to pay because health care providers are responsible for both the quality and cost of medical care they give. This is a relatively new way of paying health care providers compared to thefee-for-service way Medicare has traditionally paid, where providers are paid separately for each service they provide. Bundled payments encourage these providers to work together to provide better, more coordinated care during your hospital stay, or outpatient procedure, and through your recovery. What does BPCI Advance mean for me? Youre more likely to get even better care when hospitals, doctors, and other health care providers work together. In BPCI Advanced, hospitals, doctors, and other health care providers may be rewarded for providing better, more coordinated health care. Medicare will watch BPCI Advanced participants closely to make sure that you and other patients keep getting efficient, high quality care. What do I need to know about BPCI Advanced? Whats most important for you to know is that your Medicare rights and benefits wont change because your health care provider is participating in [address]. Medicare will keep covering all of your medically necessary services. Even though Medicare will pay your doctor in a different way under BPCI Advanced, how much you have to pay wont change. Health care providers and suppliers who are enrolled in Medicare will submit their Medicare claims like they always have. Youll have all the same Medicare rights and protections, including the right to choose which hospital, doctor, or other health care provider you see. If you dont want to get care from a health care provider whos participating in [address], then youll have to choose a different health care provider whos not participating in the Model. How can I give feedback about my health care? Medicare might ask you to take a voluntary survey about the services and care you received from  [address] during your hospital stay or outpatient procedure and for a specific period of time afterwards. You can decide whether you want to take the voluntary survey, but if you do, itll help Medicare make BPCI Advanced and the care of other Medicare patients better. If you have concerns or complaints about your care, you can:   · Talk to your doctor or health care provider. · Contact your Beneficiary and Family Centered Care Quality Improvement   Organization [facility]). You can get your [facility]s phone number  at  Medicare.gov/contacts or by calling 1-800-MEDICARE. TTY users can call  4-655.876.4165. Where can I learn more about BPCI Advanced? Learn more about BPCI Advanced at https://innovation.cms.gov/initiatives/bpci-advanced/:  · A list of all the hospitals and physician group practices in the country participating in [address]. · All of the inpatient and outpatient Clinical Episodes that are currently included under BPCI Advanced. A Clinical Episode is a grouping of medical conditions or diagnoses that are included in the [address] [facility].

## 2020-11-27 NOTE — ACP (ADVANCE CARE PLANNING)
Advance Care Planning     Advance Care Planning Activator (Inpatient)  Conversation Note      Date of ACP Conversation: 11/27/2020    Conversation Conducted with: Patient with Decision Making Capacity    ACP Activator: Camilla Bae    *When Decision Maker makes decisions on behalf of the incapacitated patient: Decision Maker is asked to consider and make decisions based on patient values, known preferences, or best interests. Health Care Decision Maker:     Current Designated Health Care Decision Maker:   (If there is a valid [place] named in the [address] [place] [address] Makers\" box in the ACP activity, but it is not visible above, be sure to open that field and then select the health care decision maker relationship (ie \"primary\") in the blank space to the right of the name.) Validate  this information as still accurate & up-to-date; edit [place] field as needed.)    Note: Assess and validate information in current ACP documents, as indicated. If no Decision Maker listed above or available through scanned documents, then:    If no Authorized Decision Maker has previously been identified, then patient chooses [place]:  \"Who would you like to name as your primary health care decision-maker? \"               Name: Kenzie Borrego Agapito        Relationship: Wife          Phone number: [phone]  \"Can this person be reached easily? \" Yes    Note: If the relationship of these Decision-Makers to the patient does NOT follow your state's Next of Kin hierarchy, recommend that patient complete ACP document that meets state-specific requirements to allow them to act on the patient's behalf when appropriate. Care Preferences    Ventilation: \"If you were in your present state of health and suddenly became very ill and were unable to breathe on your own, what would your preference be about the use of a ventilator (breathing machine) if it were available to you? \" Would the patient desire the use of ventilator (breathing machine)?: yes    \"If your health worsens and it becomes clear that your chance of recovery is unlikely, what would your preference be about the use of a ventilator (breathing machine) if it were available to you? \"     Would the patient desire the use of ventilator (breathing machine)?: No      Resuscitation  \"CPR works best to restart the heart when there is a sudden event, like a heart attack, in someone who is otherwise healthy. Unfortunately, CPR does not typically restart the heart for people who have serious health conditions or who are very sick. \"    \"In the event your heart stopped as a result of an underlying serious health condition, would you want attempts to be made to restart your heart (answer \"yes\" for attempt to resuscitate) or would you prefer a natural death (answer \"no\" for do not attempt to resuscitate)? \" yes      NOTE: If the patient has a valid advance directive AND now provides care preference(s) that are inconsistent with that prior directive, advise the patient to consider either: creating a new advance directive that complies with state-specific requirements; or, if that is not possible, orally revoking that prior directive in accordance with state-specific requirements, which must be documented in the EHR. [] Yes   [x] No   Educated Patient / Sheldon Leys regarding differences between Advance Directives and portable DNR orders.     Length of ACP Conversation in minutes:      Conversation Outcomes:  [x] ACP discussion completed  [] Existing advance directive reviewed with patient; no changes to patient's previously recorded wishes  [] New Advance Directive completed  [] Portable Do Not Rescitate prepared for Provider review and signature  [] POLST/POST/MOLST/MOST prepared for Provider review and signature      Follow-up plan:    [] Schedule follow-up conversation to continue planning  [] Referred individual to Provider for

## 2020-11-28 LAB
ADENOVIRUS BY PCR: NOT DETECTED
ALBUMIN SERPL-MCNC: 3.2 G/DL (ref 3.5–5.2)
ALP BLD-CCNC: 47 U/L (ref 40–129)
ALT SERPL-CCNC: 18 U/L (ref 0–40)
ANION GAP SERPL CALCULATED.3IONS-SCNC: 12 MMOL/L (ref 7–16)
AST SERPL-CCNC: 28 U/L (ref 0–39)
BASOPHILS ABSOLUTE: 0.02 E9/L (ref 0–0.2)
BASOPHILS RELATIVE PERCENT: 0.2 % (ref 0–2)
BILIRUB SERPL-MCNC: 0.7 MG/DL (ref 0–1.2)
BORDETELLA PARAPERTUSSIS BY PCR: NOT DETECTED
BORDETELLA PERTUSSIS BY PCR: NOT DETECTED
BUN BLDV-MCNC: 19 MG/DL (ref 8–23)
CALCIUM SERPL-MCNC: 8.6 MG/DL (ref 8.6–10.2)
CHLAMYDOPHILIA PNEUMONIAE BY PCR: NOT DETECTED
CHLORIDE BLD-SCNC: 105 MMOL/L (ref 98–107)
CO2: 22 MMOL/L (ref 22–29)
CORONAVIRUS 229E BY PCR: NOT DETECTED
CORONAVIRUS HKU1 BY PCR: NOT DETECTED
CORONAVIRUS NL63 BY PCR: NOT DETECTED
CORONAVIRUS OC43 BY PCR: NOT DETECTED
CREAT SERPL-MCNC: 1 MG/DL (ref 0.7–1.2)
EOSINOPHILS ABSOLUTE: 0.02 E9/L (ref 0.05–0.5)
EOSINOPHILS RELATIVE PERCENT: 0.2 % (ref 0–6)
GFR AFRICAN AMERICAN: >60
GFR NON-AFRICAN AMERICAN: >60 ML/MIN/1.73
GLUCOSE BLD-MCNC: 114 MG/DL (ref 74–99)
HCT VFR BLD CALC: 41 % (ref 37–54)
HEMOGLOBIN: 13.8 G/DL (ref 12.5–16.5)
HUMAN METAPNEUMOVIRUS BY PCR: NOT DETECTED
HUMAN RHINOVIRUS/ENTEROVIRUS BY PCR: NOT DETECTED
IMMATURE GRANULOCYTES #: 0.09 E9/L
IMMATURE GRANULOCYTES %: 0.9 % (ref 0–5)
INFLUENZA A BY PCR: NOT DETECTED
INFLUENZA B BY PCR: NOT DETECTED
LYMPHOCYTES ABSOLUTE: 0.64 E9/L (ref 1.5–4)
LYMPHOCYTES RELATIVE PERCENT: 6.4 % (ref 20–42)
MCH RBC QN AUTO: 29.8 PG (ref 26–35)
MCHC RBC AUTO-ENTMCNC: 33.7 % (ref 32–34.5)
MCV RBC AUTO: 88.6 FL (ref 80–99.9)
MONOCYTES ABSOLUTE: 0.46 E9/L (ref 0.1–0.95)
MONOCYTES RELATIVE PERCENT: 4.6 % (ref 2–12)
MYCOPLASMA PNEUMONIAE BY PCR: NOT DETECTED
NEUTROPHILS ABSOLUTE: 8.76 E9/L (ref 1.8–7.3)
NEUTROPHILS RELATIVE PERCENT: 87.7 % (ref 43–80)
PARAINFLUENZA VIRUS 1 BY PCR: NOT DETECTED
PARAINFLUENZA VIRUS 2 BY PCR: NOT DETECTED
PARAINFLUENZA VIRUS 3 BY PCR: NOT DETECTED
PARAINFLUENZA VIRUS 4 BY PCR: NOT DETECTED
PDW BLD-RTO: 12.8 FL (ref 11.5–15)
PLATELET # BLD: 322 E9/L (ref 130–450)
PMV BLD AUTO: 9.8 FL (ref 7–12)
POTASSIUM REFLEX MAGNESIUM: 4.9 MMOL/L (ref 3.5–5)
RBC # BLD: 4.63 E12/L (ref 3.8–5.8)
REASON FOR REJECTION: NORMAL
REJECTED TEST: NORMAL
RESPIRATORY SYNCYTIAL VIRUS BY PCR: NOT DETECTED
SARS-COV-2, PCR: DETECTED
SODIUM BLD-SCNC: 139 MMOL/L (ref 132–146)
TOTAL PROTEIN: 5.9 G/DL (ref 6.4–8.3)
WBC # BLD: 10 E9/L (ref 4.5–11.5)

## 2020-11-28 PROCEDURE — 6370000000 HC RX 637 (ALT 250 FOR IP): Performed by: INTERNAL MEDICINE

## 2020-11-28 PROCEDURE — 0202U NFCT DS 22 TRGT SARS-COV-2: CPT

## 2020-11-28 PROCEDURE — 6360000002 HC RX W HCPCS: Performed by: INTERNAL MEDICINE

## 2020-11-28 PROCEDURE — 2580000003 HC RX 258: Performed by: INTERNAL MEDICINE

## 2020-11-28 PROCEDURE — 80053 COMPREHEN METABOLIC PANEL: CPT

## 2020-11-28 PROCEDURE — 2500000003 HC RX 250 WO HCPCS: Performed by: SPECIALIST

## 2020-11-28 PROCEDURE — 2060000000 HC ICU INTERMEDIATE R&B

## 2020-11-28 PROCEDURE — 85025 COMPLETE CBC W/AUTO DIFF WBC: CPT

## 2020-11-28 PROCEDURE — 36415 COLL VENOUS BLD VENIPUNCTURE: CPT

## 2020-11-28 PROCEDURE — 2580000003 HC RX 258: Performed by: SPECIALIST

## 2020-11-28 RX ADMIN — REMDESIVIR 100 MG: 100 INJECTION, POWDER, LYOPHILIZED, FOR SOLUTION INTRAVENOUS at 18:23

## 2020-11-28 RX ADMIN — ASPIRIN 81 MG: 81 TABLET, COATED ORAL at 09:00

## 2020-11-28 RX ADMIN — ATORVASTATIN CALCIUM 10 MG: 20 TABLET, FILM COATED ORAL at 21:33

## 2020-11-28 RX ADMIN — ZOLPIDEM TARTRATE 5 MG: 5 TABLET ORAL at 21:33

## 2020-11-28 RX ADMIN — SODIUM CHLORIDE, PRESERVATIVE FREE 10 ML: 5 INJECTION INTRAVENOUS at 19:17

## 2020-11-28 RX ADMIN — SODIUM CHLORIDE, PRESERVATIVE FREE 10 ML: 5 INJECTION INTRAVENOUS at 18:26

## 2020-11-28 RX ADMIN — ENOXAPARIN SODIUM 40 MG: 40 INJECTION SUBCUTANEOUS at 21:33

## 2020-11-28 RX ADMIN — TAMSULOSIN HYDROCHLORIDE 0.4 MG: 0.4 CAPSULE ORAL at 21:33

## 2020-11-28 RX ADMIN — Medication 6000 UNITS: at 09:01

## 2020-11-28 RX ADMIN — ENOXAPARIN SODIUM 40 MG: 40 INJECTION SUBCUTANEOUS at 09:02

## 2020-11-28 RX ADMIN — METOPROLOL SUCCINATE 25 MG: 25 TABLET, EXTENDED RELEASE ORAL at 09:02

## 2020-11-28 RX ADMIN — DEXAMETHASONE 6 MG: 4 TABLET ORAL at 21:31

## 2020-11-28 RX ADMIN — DEXAMETHASONE 6 MG: 4 TABLET ORAL at 09:01

## 2020-11-28 RX ADMIN — TAMSULOSIN HYDROCHLORIDE 0.4 MG: 0.4 CAPSULE ORAL at 09:02

## 2020-11-28 RX ADMIN — Medication 10 ML: at 09:02

## 2020-11-28 RX ADMIN — PANTOPRAZOLE SODIUM 40 MG: 40 TABLET, DELAYED RELEASE ORAL at 06:32

## 2020-11-28 RX ADMIN — Medication 10 ML: at 23:52

## 2020-11-28 RX ADMIN — LOSARTAN POTASSIUM 12.5 MG: 25 TABLET, FILM COATED ORAL at 09:00

## 2020-11-28 ASSESSMENT — PAIN SCALES - GENERAL
PAINLEVEL_OUTOF10: 0

## 2020-11-28 NOTE — PLAN OF CARE
Problem: Falls - Risk of:  Goal: Will remain free from falls  Description: Will remain free from falls  11/28/2020 0244 by Asael Morton RN  Outcome: Met This Shift  11/27/2020 1856 by Gurjit Azevedo RN  Outcome: Met This Shift     Problem: Falls - Risk of:  Goal: Absence of physical injury  Description: Absence of physical injury  11/28/2020 0244 by Asael Morton RN  Outcome: Met This Shift  11/27/2020 1856 by Gurjit Azevedo RN  Outcome: Met This Shift

## 2020-11-28 NOTE — CONSULTS
Department of Internal Medicine  Infectious Diseases   Consult Note      Reason for Consult:  COVID 19 pneumonia       Requesting Physician: Dr Smitha Oseguera:               Dr Alarcongilda Crouch is a 76 yrs old male with hx of HTN - who exposed to [place] 19 pt in the OR about 2 weeks ago - screening COVID 19 test was positive . Over past few days, he felt pretty sick - reported fever, chills, diarrhea, SOB .    WBC was 12 K,LDH 3.4 , Ferritin 383 , d dimer 423   CTA chest, showed bilateral patchy GGO   Pt was given remdesivir       Past Medical History:      Past Medical History:   Diagnosis Date    BPH (benign prostatic hyperplasia)     Cardiomyopathy ([facility] [address]     Hyperlipidemia     Hypertension     Left ventricular diastolic dysfunction     Syncope and collapse          Past Surgical History:      None     Current Medications:      Current Facility-Administered Medications   Medication Dose Route Frequency Provider Last Rate Last Dose    remdesivir 100 mg in sodium chloride 0.9 % 250 mL IVPB  100 mg Intravenous Q24H Carmita Cabral MD        0.9 % sodium chloride bolus  30 mL Intravenous PRN Carmita Cabral MD        sodium chloride flush 0.9 % injection 10 mL  10 mL Intravenous 2 times per day Danial Segovia MD   10 mL at 11/28/20 0902    sodium chloride flush 0.9 % injection 10 mL  10 mL Intravenous PRN Danial Segovia MD        acetaminophen (TYLENOL) tablet 650 mg  650 mg Oral Q6H PRN Danial Segovia MD        Or   [facility] acetaminophen (TYLENOL) suppository 650 mg  650 mg Rectal Q6H PRN Danial Segovia MD        polyethylene glycol (GLYCOLAX) packet 17 g  17 g Oral Daily PRN Danial Segovia MD        promethazine (PHENERGAN) tablet 12.5 mg  12.5 mg Oral Q6H PRN Danial Segovia MD        Or    ondansetron (ZOFRAN) injection 4 mg  4 mg Intravenous Q6H PRN Danial Segovia MD        enoxaparin (LOVENOX) injection 40 mg  40 mg Subcutaneous BID Danial Segovia MD   40 mg at 11/28/20 0902    Vitamin D (CHOLECALCIFEROL) tablet 6,000 Units  6,000 Units Oral Daily Danial Segovia MD   6,000 Units at 11/28/20 0901    dexamethasone (DECADRON) tablet 6 mg  6 mg Oral 2 times per day Danial Segovia MD   6 mg at 11/28/20 0901    aspirin EC tablet 81 mg  81 mg Oral Daily Danial Segovia MD   81 mg at 11/28/20 0900    atorvastatin (LIPITOR) tablet 10 mg  10 mg Oral Daily Danial Segovia MD   10 mg at 11/27/20 2109    metoprolol succinate (TOPROL XL) extended release tablet 25 mg  25 mg Oral Daily Danial Segovia MD   25 mg at 11/28/20 0902    losartan (COZAAR) tablet 12.5 mg  12.5 mg Oral Daily Danial Segovia MD   12.5 mg at 11/28/20 0900    pantoprazole (PROTONIX) tablet 40 mg  40 mg Oral QAM AC Danial Segovia MD   40 mg at 11/28/20 9718    tamsulosin (FLOMAX) capsule 0.4 mg  0.4 mg Oral BID Danial Segovia MD   0.4 mg at 11/28/20 0902    zolpidem (AMBIEN) tablet 5 mg  5 mg Oral Nightly PRN Danial Segovia MD   5 mg at 11/27/20 3205       Allergies:  Patient has no known allergies.     Social History:      Social History     Socioeconomic History    Marital status:      Spouse name: Not on file    Number of children: Not on file    Years of education: Not on file    Highest education level: Not on file   Occupational History    Not on file   Social Needs    Financial resource strain: Not on file    Food insecurity     Worry: Not on file     Inability: Not on file    Transportation needs     Medical: Not on file     Non-medical: Not on file   Tobacco Use    Smoking status: Never Smoker    Smokeless tobacco: Never Used   Substance and Sexual Activity    Alcohol use: Yes     Comment: occasional     Drug use: No    Sexual activity: Not on file   Lifestyle    Physical activity     Days per week: Not on file     Minutes per session: Not on file    Stress: Not on file   Relationships    Social connections     Talks on phone: Not on file     Gets together: Not on file     Attends Mu-ism service: Not on file     Active member of club or organization: Not on file     Attends meetings of clubs or organizations: Not on file     Relationship status: Not on file    Intimate partner violence     Fear of current or ex partner: Not on file     Emotionally abused: Not on file     Physically abused: Not on file     Forced sexual activity: Not on file   Other Topics Concern    Not on file   Social History Narrative    Not on file       Family History:   Not pertinent to present illness       REVIEW OF SYSTEMS:      CONSTITUTIONAL:  Denies fever, chill or rigors, nausea or vomiting. HEENT: denies blurring of vision or double vision, denies hearing problem  RESPIRATORY: SOB  CARDIOVASCULAR:  Denies palpitation  GASTROINTESTINAL: Diarrhea   GENITOURINARY:  Denies burning urination or frequency of urination  INTEGUMENT: denies wound , rash  HEMATOLOGIC/LYMPHATIC:  Denies lymph node swelling, gum bleeding or easy bruising. MUSCULOSKELETAL:  Denies leg pain , joint pain , joint swelling  NEUROLOGICAL:  Weakness     PHYSICAL EXAM:      Vitals:     Vitals:    11/28/20 0830   BP: (!) 150/90   Pulse: 65   Resp: 19   Temp: 97.1 °F (36.2 °C)   SpO2: 95%       General Appearance:    Awake, alert , no acute distress. Head:    Normocephalic, atraumatic   Eyes:    No pallor, no icterus,   Ears:    No obvious deformity or drainage.    Nose:   No nasal drainage   Throat:   Mucosa moist, no oral thrush   Neck:   Supple, no lymphadenopathy   Back:     no CVA tenderness   Lungs:     Clear to auscultation bilaterally, no wheeze    Heart:    Regular rate and rhythm, no murmur, rub or gallop   Abdomen:     Soft, non-tender, bowel sounds present    Extremities:   No edema, no cyanosis    Pulses:   Dorsalis pedis palpable    Skin:   no rashes or lesions         CBC with Differential:      Lab Results   Component Value Date    WBC 10.0 11/28/2020    RBC 4.63 11/28/2020    HGB 13.8 11/28/2020    HCT 41.0 11/28/2020     11/28/2020    MCV 88.6 11/28/2020 MCH 29.8 11/28/2020    MCHC 33.7 11/28/2020    RDW 12.8 11/28/2020    LYMPHOPCT 6.4 11/28/2020    MONOPCT 4.6 11/28/2020    BASOPCT 0.2 11/28/2020    MONOSABS 0.46 11/28/2020    LYMPHSABS 0.64 11/28/2020    EOSABS 0.02 11/28/2020    BASOSABS 0.02 11/28/2020       CMP     Lab Results   Component Value Date     11/28/2020    K 4.9 11/28/2020     11/28/2020    CO2 22 11/28/2020    BUN 19 11/28/2020    CREATININE 1.0 11/28/2020    GFRAA >60 11/28/2020    LABGLOM >60 11/28/2020    GLUCOSE 114 11/28/2020    PROT 5.9 11/28/2020    LABALBU 3.2 11/28/2020    CALCIUM 8.6 11/28/2020    BILITOT 0.7 11/28/2020    ALKPHOS 47 11/28/2020    AST 28 11/28/2020    ALT 18 11/28/2020         Hepatic Function Panel:    Lab Results   Component Value Date    ALKPHOS 47 11/28/2020    ALT 18 11/28/2020    AST 28 11/28/2020    PROT 5.9 11/28/2020    BILITOT 0.7 11/28/2020    BILIDIR 0.6 09/24/2013    LABALBU 3.2 11/28/2020       PT/INR:    Lab Results   Component Value Date    PROTIME 12.2 11/27/2020    INR 1.1 11/27/2020       TSH:    Lab Results   Component Value Date    TSH 2.970 11/29/2015       U/A:  No results found for: NITRITE, COLORU, PHUR, LABCAST, WBCUA, RBCUA, MUCUS, TRICHOMONAS, YEAST, BACTERIA, CLARITYU, SPECGRAV, LEUKOCYTESUR, UROBILINOGEN, BILIRUBINUR, BLOODU, GLUCOSEU, AMORPHOUS    ABG:  No results found for: Marie Fuelling, X2XDDHDF, PHART, THGBART, HXZ9EYW, PO2ART, UUA4FFD        Radiology :    Chest X ray    CT scan of chest -  No central pulmonary embolism or aortic dissection. Cardiomegaly with coronary artery calcification. Patchy and nodular/ground-glass infiltrates bilaterally concerning for    multifocal pneumonia. A nodular densities adjacent to the right cardiac border likely pericardial    sleeve.  Consider surveillance. IMPRESSION:     1. COVID 19 pneumonia ( mild )       RECOMMENDATIONS:    1. Remdesivir 100 mg IV q 24 hrs  2. Decadron 6 mg po q 24 hr  3.  Vitamin d 100 units q

## 2020-11-28 NOTE — PLAN OF CARE
Problem: Falls - Risk of:  Goal: Will remain free from falls  Description: Will remain free from falls  11/28/2020 1418 by Edwina Duverney, RN  Outcome: Met This Shift  11/28/2020 0244 by Huong Hairston RN  Outcome: Met This Shift  Goal: Absence of physical injury  Description: Absence of physical injury  11/28/2020 1418 by Edwina Duverney, RN  Outcome: Met This Shift  11/28/2020 0244 by Huong Hairston RN  Outcome: Met This Shift

## 2020-11-29 VITALS
WEIGHT: 240 LBS | SYSTOLIC BLOOD PRESSURE: 148 MMHG | HEART RATE: 61 BPM | BODY MASS INDEX: 30.8 KG/M2 | HEIGHT: 74 IN | TEMPERATURE: 96.7 F | DIASTOLIC BLOOD PRESSURE: 70 MMHG | RESPIRATION RATE: 19 BRPM | OXYGEN SATURATION: 94 %

## 2020-11-29 LAB
ALBUMIN SERPL-MCNC: 3.1 G/DL (ref 3.5–5.2)
ALP BLD-CCNC: 68 U/L (ref 40–129)
ALT SERPL-CCNC: 19 U/L (ref 0–40)
ANION GAP SERPL CALCULATED.3IONS-SCNC: 11 MMOL/L (ref 7–16)
APTT: 27.3 SEC (ref 24.5–35.1)
AST SERPL-CCNC: 20 U/L (ref 0–39)
BASOPHILS ABSOLUTE: 0.02 E9/L (ref 0–0.2)
BASOPHILS RELATIVE PERCENT: 0.1 % (ref 0–2)
BILIRUB SERPL-MCNC: 0.6 MG/DL (ref 0–1.2)
BUN BLDV-MCNC: 27 MG/DL (ref 8–23)
CALCIUM SERPL-MCNC: 8.8 MG/DL (ref 8.6–10.2)
CHLORIDE BLD-SCNC: 106 MMOL/L (ref 98–107)
CO2: 21 MMOL/L (ref 22–29)
CREAT SERPL-MCNC: 0.9 MG/DL (ref 0.7–1.2)
D DIMER: 209 NG/ML DDU
EOSINOPHILS ABSOLUTE: 0 E9/L (ref 0.05–0.5)
EOSINOPHILS RELATIVE PERCENT: 0 % (ref 0–6)
FIBRINOGEN: 447 MG/DL (ref 225–540)
GFR AFRICAN AMERICAN: >60
GFR NON-AFRICAN AMERICAN: >60 ML/MIN/1.73
GLUCOSE BLD-MCNC: 162 MG/DL (ref 74–99)
HCT VFR BLD CALC: 39.5 % (ref 37–54)
HEMOGLOBIN: 13.3 G/DL (ref 12.5–16.5)
IMMATURE GRANULOCYTES #: 0.19 E9/L
IMMATURE GRANULOCYTES %: 1.1 % (ref 0–5)
INR BLD: 1.1
LYMPHOCYTES ABSOLUTE: 0.74 E9/L (ref 1.5–4)
LYMPHOCYTES RELATIVE PERCENT: 4.3 % (ref 20–42)
MCH RBC QN AUTO: 29.4 PG (ref 26–35)
MCHC RBC AUTO-ENTMCNC: 33.7 % (ref 32–34.5)
MCV RBC AUTO: 87.4 FL (ref 80–99.9)
MONOCYTES ABSOLUTE: 0.68 E9/L (ref 0.1–0.95)
MONOCYTES RELATIVE PERCENT: 3.9 % (ref 2–12)
NEUTROPHILS ABSOLUTE: 15.64 E9/L (ref 1.8–7.3)
NEUTROPHILS RELATIVE PERCENT: 90.6 % (ref 43–80)
PDW BLD-RTO: 12.6 FL (ref 11.5–15)
PLATELET # BLD: 377 E9/L (ref 130–450)
PMV BLD AUTO: 10.1 FL (ref 7–12)
POTASSIUM REFLEX MAGNESIUM: 4.6 MMOL/L (ref 3.5–5)
PROSTATE SPECIFIC ANTIGEN FREE: 0.6 NG/ML
PROSTATE SPECIFIC ANTIGEN PERCENT FREE: 13 %
PROSTATE SPECIFIC ANTIGEN: 4.7 NG/ML (ref 0–4)
PROTHROMBIN TIME: 12.1 SEC (ref 9.3–12.4)
RBC # BLD: 4.52 E12/L (ref 3.8–5.8)
SODIUM BLD-SCNC: 138 MMOL/L (ref 132–146)
TOTAL PROTEIN: 5.5 G/DL (ref 6.4–8.3)
WBC # BLD: 17.3 E9/L (ref 4.5–11.5)

## 2020-11-29 PROCEDURE — 2500000003 HC RX 250 WO HCPCS: Performed by: SPECIALIST

## 2020-11-29 PROCEDURE — 2580000003 HC RX 258: Performed by: SPECIALIST

## 2020-11-29 PROCEDURE — 85025 COMPLETE CBC W/AUTO DIFF WBC: CPT

## 2020-11-29 PROCEDURE — 85730 THROMBOPLASTIN TIME PARTIAL: CPT

## 2020-11-29 PROCEDURE — 6370000000 HC RX 637 (ALT 250 FOR IP): Performed by: INTERNAL MEDICINE

## 2020-11-29 PROCEDURE — 85384 FIBRINOGEN ACTIVITY: CPT

## 2020-11-29 PROCEDURE — 85610 PROTHROMBIN TIME: CPT

## 2020-11-29 PROCEDURE — 80053 COMPREHEN METABOLIC PANEL: CPT

## 2020-11-29 PROCEDURE — 6360000002 HC RX W HCPCS: Performed by: INTERNAL MEDICINE

## 2020-11-29 PROCEDURE — 2580000003 HC RX 258: Performed by: INTERNAL MEDICINE

## 2020-11-29 PROCEDURE — 85378 FIBRIN DEGRADE SEMIQUANT: CPT

## 2020-11-29 RX ORDER — DEXAMETHASONE 6 MG/1
6 TABLET ORAL DAILY
Qty: 4 TABLET | Refills: 0 | Status: SHIPPED
Start: 2020-11-29 | End: 2020-12-03

## 2020-11-29 RX ORDER — ZOLPIDEM TARTRATE 5 MG/1
5 TABLET ORAL NIGHTLY PRN
Qty: 5 TABLET | Refills: 0 | Status: SHIPPED | OUTPATIENT
Start: 2020-11-29 | End: 2020-12-10 | Stop reason: CLARIF

## 2020-11-29 RX ADMIN — REMDESIVIR 100 MG: 100 INJECTION, POWDER, LYOPHILIZED, FOR SOLUTION INTRAVENOUS at 13:55

## 2020-11-29 RX ADMIN — PANTOPRAZOLE SODIUM 40 MG: 40 TABLET, DELAYED RELEASE ORAL at 08:12

## 2020-11-29 RX ADMIN — TAMSULOSIN HYDROCHLORIDE 0.4 MG: 0.4 CAPSULE ORAL at 08:12

## 2020-11-29 RX ADMIN — ENOXAPARIN SODIUM 40 MG: 40 INJECTION SUBCUTANEOUS at 08:11

## 2020-11-29 RX ADMIN — Medication 10 ML: at 08:11

## 2020-11-29 RX ADMIN — ASPIRIN 81 MG: 81 TABLET, COATED ORAL at 08:13

## 2020-11-29 RX ADMIN — METOPROLOL SUCCINATE 25 MG: 25 TABLET, EXTENDED RELEASE ORAL at 08:12

## 2020-11-29 RX ADMIN — DEXAMETHASONE 6 MG: 4 TABLET ORAL at 08:12

## 2020-11-29 RX ADMIN — Medication 6000 UNITS: at 08:12

## 2020-11-29 ASSESSMENT — PAIN SCALES - GENERAL
PAINLEVEL_OUTOF10: 0
PAINLEVEL_OUTOF10: 0

## 2020-11-29 NOTE — PROGRESS NOTES
Department of Internal Medicine  Infectious Diseases  Progress Note      C/C :  COVID 19 pneumonia       Pt denies fever and chills  Denies SOB   Feels better   Afebrile     Current Facility-Administered Medications   Medication Dose Route Frequency Provider Last Rate Last Dose    remdesivir 100 mg in sodium chloride 0.9 % 250 mL IVPB  100 mg Intravenous Q24H Brenda Fernandes  mL/hr at 11/29/20 1355 100 mg at 11/29/20 1355    0.9 % sodium chloride bolus  30 mL Intravenous PRN Brenda Fernandes MD        sodium chloride flush 0.9 % injection 10 mL  10 mL Intravenous 2 times per day Edin Wang MD   10 mL at 11/29/20 0811    sodium chloride flush 0.9 % injection 10 mL  10 mL Intravenous PRN Edin Wang MD   10 mL at 11/28/20 1917    acetaminophen (TYLENOL) tablet 650 mg  650 mg Oral Q6H PRN Edin Wang MD        Or   [facility] acetaminophen (TYLENOL) suppository 650 mg  650 mg Rectal Q6H PRN Edin Wang MD        polyethylene glycol (GLYCOLAX) packet 17 g  17 g Oral Daily PRN Edin Wang MD        promethazine (PHENERGAN) tablet 12.5 mg  12.5 mg Oral Q6H PRN Edin Wang MD        Or    ondansetron (ZOFRAN) injection 4 mg  4 mg Intravenous Q6H PRN Edin Wang MD        enoxaparin (LOVENOX) injection 40 mg  40 mg Subcutaneous BID Edin Wang MD   40 mg at 11/29/20 0811    Vitamin D (CHOLECALCIFEROL) tablet 6,000 Units  6,000 Units Oral Daily Edin Wang MD   6,000 Units at 11/29/20 0812    dexamethasone (DECADRON) tablet 6 mg  6 mg Oral 2 times per day Edin Wang MD   6 mg at 11/29/20 8844    aspirin EC tablet 81 mg  81 mg Oral Daily Edin Wang MD   81 mg at 11/29/20 0813    atorvastatin (LIPITOR) tablet 10 mg  10 mg Oral Daily Edin Wang MD   10 mg at 11/28/20 2133    metoprolol succinate (TOPROL XL) extended release tablet 25 mg  25 mg Oral Daily Edin Wang MD   25 mg at 11/29/20 4822    losartan (COZAAR) tablet 12.5 mg  12.5 mg Oral Daily Edin Wang MD   12.5 mg at 11/28/20 0900    LYMPHSABS 0.74 11/29/2020    EOSABS 0.00 11/29/2020    BASOSABS 0.02 11/29/2020       CMP     Lab Results   Component Value Date     11/29/2020    K 4.6 11/29/2020     11/29/2020    CO2 21 11/29/2020    BUN 27 11/29/2020    CREATININE 0.9 11/29/2020    GFRAA >60 11/29/2020    LABGLOM >60 11/29/2020    GLUCOSE 162 11/29/2020    PROT 5.5 11/29/2020    LABALBU 3.1 11/29/2020    CALCIUM 8.8 11/29/2020    BILITOT 0.6 11/29/2020    ALKPHOS 68 11/29/2020    AST 20 11/29/2020    ALT 19 11/29/2020         Hepatic Function Panel:    Lab Results   Component Value Date    ALKPHOS 68 11/29/2020    ALT 19 11/29/2020    AST 20 11/29/2020    PROT 5.5 11/29/2020    BILITOT 0.6 11/29/2020    BILIDIR 0.6 09/24/2013    LABALBU 3.1 11/29/2020       PT/INR:    Lab Results   Component Value Date    PROTIME 12.1 11/29/2020    INR 1.1 11/29/2020       SARS-CoV-2, PCR  DETECTEDAbnormal    Not Detected  Final  11/28/2020  2:50 PM   - [facility]          Radiology :    Chest X ray    CT scan of chest -  No central pulmonary embolism or aortic dissection. Cardiomegaly with coronary artery calcification. Patchy and nodular/ground-glass infiltrates bilaterally concerning for    multifocal pneumonia. A nodular densities adjacent to the right cardiac border likely pericardial    sleeve.  Consider surveillance. IMPRESSION:     1. COVID 19 pneumonia ( mild )   Oxygen saturation 94% on room air       RECOMMENDATIONS:    1.  Stop Remdesivir after today's dose and discharge pt home

## 2020-11-29 NOTE — PROGRESS NOTES
Spoke with Pharmacist to push forward remdesivir dose to 1400 today per Dr. Katharine Esqueda order, for patient discharge this afternoon.

## 2020-11-29 NOTE — PROGRESS NOTES
Comprehensive Nutrition Assessment    Type and Reason for Visit:  Initial, Positive Nutrition Screen    Nutrition Recommendations/Plan: Continue Current Diet, Start Oral Nutrition Supplement  Magic Cup BID to aide in nutritional status. Nutrition Assessment:  Pt admit 2/2 +COVID-19. Noted SOB/diarrhea x2 wk PTA. PO diet advanced w/ poor intake. Malnutrition Assessment:  Malnutrition Status:  Insufficient data    Context:  Acute Illness     Findings of the 6 clinical characteristics of malnutrition:  Energy Intake:  1 - 75% or less of estimated energy requirements for 7 or more days  Weight Loss:  Unable to assess     Body Fat Loss:  Unable to assess     Muscle Mass Loss:  Unable to assess    Fluid Accumulation:  No significant fluid accumulation     Strength:  Not Performed    Estimated Daily Nutrient Needs:  Energy (kcal):  MSJ 1893 x1.1= 2082; ; Weight Used for Energy Requirements:  Current     Protein (g):  110-130(1.3-1.5gm/kg IBW); Weight Used for Protein Requirements:  Ideal          Nutrition Related Findings:  active BS, abd distention, diarrhea PTA, SOB, generalized non-pitting edema, fluids WNL, fatigue      Wounds:  None       Current Nutrition Therapies:    DIET GENERAL; Anthropometric Measures:  · Height: 6' 2\" (188 cm)  · Current Body Weight: 240 lb (108.9 kg)(11/27 no method, UTO updated wt during assessment)   · Admission Body Weight:      · Usual Body Weight: (UTO)     · Ideal Body Weight: 190 lbs; % Ideal Body Weight 126.3 %   · BMI: 30.8   · BMI Categories: Obese Class 1 (BMI 30.0-34. 9)       Nutrition Diagnosis:   · Inadequate oral intake related to impaired respiratory function as evidenced by intake 26-50%    Nutrition Interventions:   Nutrition Education/Counseling:  Education not indicated   Coordination of Nutrition Care:  Continue to monitor while inpatient, Coordination of Community Care    Goals:  Pt to consume >75% of meals and ONS       Nutrition Monitoring and

## 2020-11-29 NOTE — PROGRESS NOTES
Problems:    * No resolved hospital problems.  *       Continue dex and rem    Not overtly hypoxemic but O2 sats still on the low end of normal (92%/RA) - not ready for d/c yet, especially with the diffuse infiltrates    Requires continued inpatient level of care     Farnaz Sport    7:37 PM  11/28/2020  Cell: [phone]

## 2020-11-29 NOTE — PLAN OF CARE
Problem: Falls - Risk of:  Goal: Will remain free from falls  Description: Will remain free from falls  11/29/2020 0310 by Laurence Argueta RN  Outcome: Met This Shift  11/28/2020 1418 by Shady Gabriel RN  Outcome: Met This Shift     Problem: Falls - Risk of:  Goal: Absence of physical injury  Description: Absence of physical injury  11/29/2020 0310 by Laurence Argueta RN  Outcome: Met This Shift  11/28/2020 1418 by Shady Gabriel RN  Outcome: Met This Shift     Problem: Airway Clearance - Ineffective  Goal: Achieve or maintain patent airway  Outcome: Met This Shift

## 2020-11-29 NOTE — DISCHARGE SUMMARY
tablet Take 1 tablet by mouth daily for 4 days  Qty: 4 tablet, Refills: 0         CONTINUE these medications which have NOT CHANGED    Details   omeprazole (PRILOSEC) 40 MG delayed release capsule Take 40 mg by mouth daily as needed       metoprolol succinate (TOPROL XL) 25 MG extended release tablet Take 25 mg by mouth daily       tamsulosin (FLOMAX) 0.4 MG capsule Take 0.4 mg by mouth 2 times daily       olmesartan (BENICAR) 40 MG tablet Take 40 mg by mouth daily       atorvastatin (LIPITOR) 10 MG tablet Take 10 mg by mouth daily. aspirin 81 MG EC tablet Take 81 mg by mouth daily. albuterol sulfate HFA (VENTOLIN HFA) 108 (90 Base) MCG/ACT inhaler Inhale 2 puffs into the lungs 4 times daily as needed for Wheezing  Qty: 1 Inhaler, Refills: 0         STOP taking these medications       atenolol (TENORMIN) 25 MG tablet Comments:   Reason for Stopping:             Activity: activity as tolerated  Diet: regular diet    Follow-up with PCP in 1 week.     Signed:  Li Corbett    11/29/2020  1:51 PM

## 2020-11-30 ENCOUNTER — CARE COORDINATION (OUTPATIENT)
Dept: CASE MANAGEMENT | Age: 75
End: 2020-11-30

## 2020-11-30 NOTE — CARE COORDINATION
[facility] Trans[facility] Initial Follow Up Call    Call within 2 business days of discharge: Yes    Patient: Raquel Medeiros Memo Patient : 1945   MRN: [id number]  Reason for Admission: COVID-19 virus infection  Discharge Date: 20 RARS: Readmission Risk Score: 13      Last Discharge [facility]       Complaint Diagnosis Description Type Department Provider    20 Concern For COVID-19 COVID-19 virus infection ED to Hosp-Admission (Discharged) (ADMITTED) Clearance Severin, MD; Berta Olea ... Patient contacted regarding BYJRN-26 diagnosis\". Discussed COVID-19 related testing which was available at this time. Test results were positive done on 20. Patient informed of results, if available? Yes, patient was aware of positive result and states he also had a positive result  a week earlier on a test done in [facility]. Care Transition Nurse/ Ambulatory Care Manager contacted the patient by telephone to perform post discharge assessment. Call within 2 business days of discharge: Yes. Provided introduction to self, and explanation of the CTN/ACM role, and reason for call due to risk factors for infection and/or exposure to COVID-19. Symptoms reviewed with patient who verbalized the following symptoms: Patient states he is improving and is able to drink fluids and is eating some solid food. Patient states he will self isolate for another week from discharge date(20.)  Patient states his wife is also positive for covid-19 and is immunocompromised but she is at home with him and doing okay. Due to no new or worsening symptoms encounter was not routed to provider for escalation. Discussed follow-up appointments. If no appointment was previously scheduled, appointment scheduling offered: patient has appointment with his PCP on 20. Patient states he will also contact ID in a couple weeks for follow up.    [facility] follow up appointment(s): No future

## 2020-12-10 ENCOUNTER — OFFICE VISIT (OUTPATIENT)
Dept: CARDIOLOGY CLINIC | Age: 75
End: 2020-12-10
Payer: MEDICARE

## 2020-12-10 VITALS
WEIGHT: 242 LBS | SYSTOLIC BLOOD PRESSURE: 118 MMHG | DIASTOLIC BLOOD PRESSURE: 64 MMHG | RESPIRATION RATE: 16 BRPM | HEIGHT: 73 IN | HEART RATE: 85 BPM | BODY MASS INDEX: 32.07 KG/M2

## 2020-12-10 PROCEDURE — 1111F DSCHRG MED/CURRENT MED MERGE: CPT | Performed by: INTERNAL MEDICINE

## 2020-12-10 PROCEDURE — 3017F COLORECTAL CA SCREEN DOC REV: CPT | Performed by: INTERNAL MEDICINE

## 2020-12-10 PROCEDURE — G8419 CALC BMI OUT NRM PARAM NOF/U: HCPCS | Performed by: INTERNAL MEDICINE

## 2020-12-10 PROCEDURE — 1123F ACP DISCUSS/DSCN MKR DOCD: CPT | Performed by: INTERNAL MEDICINE

## 2020-12-10 PROCEDURE — 93000 ELECTROCARDIOGRAM COMPLETE: CPT | Performed by: INTERNAL MEDICINE

## 2020-12-10 PROCEDURE — G8484 FLU IMMUNIZE NO ADMIN: HCPCS | Performed by: INTERNAL MEDICINE

## 2020-12-10 PROCEDURE — 99213 OFFICE O/P EST LOW 20 MIN: CPT | Performed by: INTERNAL MEDICINE

## 2020-12-10 PROCEDURE — 4040F PNEUMOC VAC/ADMIN/RCVD: CPT | Performed by: INTERNAL MEDICINE

## 2020-12-10 PROCEDURE — G8427 DOCREV CUR MEDS BY ELIG CLIN: HCPCS | Performed by: INTERNAL MEDICINE

## 2020-12-10 PROCEDURE — 1036F TOBACCO NON-USER: CPT | Performed by: INTERNAL MEDICINE

## 2020-12-10 RX ORDER — DOXAZOSIN 2 MG/1
2 TABLET ORAL NIGHTLY
COMMUNITY
Start: 2020-12-09

## 2020-12-10 NOTE — PROGRESS NOTES
Patient Active Problem List   Diagnosis    Hypertension    Left ventricular diastolic dysfunction    LVH (left ventricular hypertrophy)    Pneumonia due to COVID-19 virus       Current Outpatient Medications   Medication Sig Dispense Refill    doxazosin (CARDURA) 2 MG tablet Take 2 mg by mouth nightly       omeprazole (PRILOSEC) 40 MG delayed release capsule Take 40 mg by mouth daily as needed       metoprolol succinate (TOPROL XL) 25 MG extended release tablet Take 25 mg by mouth daily       tamsulosin (FLOMAX) 0.4 MG capsule Take 0.4 mg by mouth 2 times daily       olmesartan (BENICAR) 40 MG tablet Take 40 mg by mouth daily       atorvastatin (LIPITOR) 10 MG tablet Take 10 mg by mouth daily.  aspirin 81 MG EC tablet Take 81 mg by mouth daily.  zolpidem (AMBIEN) 5 MG tablet Take 1 tablet by mouth nightly as needed for Sleep for up to 14 days. (Patient not taking: Reported on 12/10/2020) 5 tablet 0    albuterol sulfate HFA (VENTOLIN HFA) 108 (90 Base) MCG/ACT inhaler Inhale 2 puffs into the lungs 4 times daily as needed for Wheezing (Patient not taking: Reported on 12/10/2020) 1 Inhaler 0     No current facility-administered medications for this visit. CC:    Patient is seen in follow up for:  1. LVH (left ventricular hypertrophy)    2. Essential hypertension    3. Cardiomegaly        HPI:  Patient is seen after findings of cardiomegaly and coronary artery calcification on recent CT scan of chest.  Patient is doing well without any specific cardiac problems. Patient denies any shortness of breath, chest pain, lightheadedness or dizziness. Patient is tolerating medications well without side effects.       ROS:   General: No unusual weight gain, no change in exercise tolerance  Skin: No rash or itching  EENT: No vision changes or nosebleeds  Cardiovascular: No orthopnea or paroxysmal nocturnal dyspnea  Respiratory: No cough or hemoptysis  Gastrointestinal: No hematemesis or recent changes in bowel habits  Genitourinary: No hematuria, urgency or frequency  Musculoskeletal: No muscular weakness or joint swelling   Neurologic / Psychiatric: No incoordination or convulsions  Allergic / Immunologic/ Lymphatic / Endocrine: No anemia or bleeding tendency    Social History     Socioeconomic History    Marital status:      Spouse name: Not on file    Number of children: Not on file    Years of education: Not on file    Highest education level: Not on file   Occupational History    Not on file   Social Needs    Financial resource strain: Not on file    Food insecurity     Worry: Not on file     Inability: Not on file    Transportation needs     Medical: Not on file     Non-medical: Not on file   Tobacco Use    Smoking status: Never Smoker    Smokeless tobacco: Never Used   Substance and Sexual Activity    Alcohol use: Yes     Comment: occasional     Drug use: No    Sexual activity: Not on file   Lifestyle    Physical activity     Days per week: Not on file     Minutes per session: Not on file    Stress: Not on file   Relationships    Social connections     Talks on phone: Not on file     Gets together: Not on file     Attends Shinto service: Not on file     Active member of club or organization: Not on file     Attends meetings of clubs or organizations: Not on file     Relationship status: Not on file    Intimate partner violence     Fear of current or ex partner: Not on file     Emotionally abused: Not on file     Physically abused: Not on file     Forced sexual activity: Not on file   Other Topics Concern    Not on file   Social History Narrative    Not on file       History reviewed. No pertinent family history.     Past Medical History:   Diagnosis Date    BPH (benign prostatic hyperplasia)     Cardiomyopathy ([facility] [address]     Hyperlipidemia     Hypertension     Left ventricular diastolic dysfunction     Syncope and collapse        PHYSICAL EXAM:  CONSTITUTIONAL:  Well

## 2020-12-30 ENCOUNTER — TELEPHONE (OUTPATIENT)
Dept: CARDIOLOGY | Age: 75
End: 2020-12-30

## 2020-12-30 NOTE — TELEPHONE ENCOUNTER
CALLED PATIENT AND LEFT MESSAGE TO SCHEDULE ECHO    Electronically signed by Christella Sever on 12/30/2020 at 12:01 PM

## 2021-01-05 ENCOUNTER — TELEPHONE (OUTPATIENT)
Dept: CARDIOLOGY | Age: 76
End: 2021-01-05

## 2021-01-05 NOTE — TELEPHONE ENCOUNTER
SCHEDULED ECHO FOR 01-14-21. REVIEWED COVID CHECKLIST WITH PATIENT.     Electronically signed by Lois Harrell on 1/5/2021 at 9:12 AM

## 2021-01-13 ENCOUNTER — TELEPHONE (OUTPATIENT)
Dept: CARDIOLOGY | Age: 76
End: 2021-01-13

## 2021-01-14 ENCOUNTER — HOSPITAL ENCOUNTER (OUTPATIENT)
Dept: CARDIOLOGY | Age: 76
Discharge: HOME | End: 2021-01-14
Payer: MEDICARE

## 2021-01-14 DIAGNOSIS — I51.7 CARDIOMEGALY: ICD-10-CM

## 2021-01-14 LAB
LV EF: 60 %
LVEF MODALITY: NORMAL

## 2021-01-14 PROCEDURE — 93306 TTE W/DOPPLER COMPLETE: CPT | Performed by: PSYCHIATRY & NEUROLOGY

## 2021-02-26 ENCOUNTER — CARE COORDINATION (OUTPATIENT)
Dept: CASE MANAGEMENT | Age: 76
End: 2021-02-26

## 2021-02-26 NOTE — CARE COORDINATION
[facility] Follow Up Call    2021    Patient: Glen Altman  Patient : 1945   MRN: <[id number]>  Reason for Admission:   Discharge Date: 20 RARS: Readmission Risk Score: 13         Spoke with: Patient    Patient states he is doing very well. No C/O SOB, wheezing, cough, chest pain, fatigue, fever, appetite good. Patient has no needs or concerns for writer at this time. Instructed patient that this is the Final [facility] Call (Patient has been followed for 90 days) and to call PCP/Specialist for any problems or issues that occur. Patient Expresses Understanding. Ajay Jose LPN    [phone]  Wolf Morales / [facility] Transitions Subsequent and Final Call    Subsequent and Final Calls  Do you have any ongoing symptoms?: No  Have your medications changed?: No  Do you have any questions related to your medications?: No  Do you currently have any active services?: No  Do you have any needs or concerns that I can assist you with?: No  Identified Barriers: None  Care Transitions Interventions  Other Interventions: Follow Up  No future appointments.     Ajay Jose LPN

## 2021-08-03 LAB
BUN BLDV-MCNC: 14 MG/DL (ref 6–23)
CREAT SERPL-MCNC: 1.1 MG/DL (ref 0.7–1.2)
GFR AFRICAN AMERICAN: >60
GFR NON-AFRICAN AMERICAN: >60 ML/MIN/1.73

## 2021-11-18 ENCOUNTER — HOSPITAL ENCOUNTER (OUTPATIENT)
Age: 76
Discharge: HOME | End: 2021-11-20

## 2021-11-18 PROCEDURE — 88305 TISSUE EXAM BY PATHOLOGIST: CPT

## 2021-12-09 ENCOUNTER — HOSPITAL ENCOUNTER (OUTPATIENT)
Age: 76
Discharge: HOME | End: 2021-12-11

## 2021-12-09 LAB
ABO/RH: NORMAL
ANION GAP SERPL CALCULATED.3IONS-SCNC: 10 MMOL/L (ref 7–16)
ANTIBODY SCREEN: NORMAL
BUN BLDV-MCNC: 26 MG/DL (ref 6–23)
CALCIUM SERPL-MCNC: 8.3 MG/DL (ref 8.6–10.2)
CHLORIDE BLD-SCNC: 110 MMOL/L (ref 98–107)
CO2: 22 MMOL/L (ref 22–29)
CREAT SERPL-MCNC: 1.2 MG/DL (ref 0.7–1.2)
GFR AFRICAN AMERICAN: >60
GFR NON-AFRICAN AMERICAN: 59 ML/MIN/1.73
GLUCOSE BLD-MCNC: 95 MG/DL (ref 74–99)
HCT VFR BLD CALC: 38.1 % (ref 37–54)
HEMOGLOBIN: 12.3 G/DL (ref 12.5–16.5)
MCH RBC QN AUTO: 29.6 PG (ref 26–35)
MCHC RBC AUTO-ENTMCNC: 32.3 % (ref 32–34.5)
MCV RBC AUTO: 91.8 FL (ref 80–99.9)
PDW BLD-RTO: 13.1 FL (ref 11.5–15)
PLATELET # BLD: 282 E9/L (ref 130–450)
PMV BLD AUTO: 10.1 FL (ref 7–12)
POTASSIUM SERPL-SCNC: 3.8 MMOL/L (ref 3.5–5)
RBC # BLD: 4.15 E12/L (ref 3.8–5.8)
SODIUM BLD-SCNC: 142 MMOL/L (ref 132–146)
WBC # BLD: 9.5 E9/L (ref 4.5–11.5)

## 2021-12-09 PROCEDURE — 86901 BLOOD TYPING SEROLOGIC RH(D): CPT

## 2021-12-09 PROCEDURE — 87081 CULTURE SCREEN ONLY: CPT

## 2021-12-09 PROCEDURE — 85027 COMPLETE CBC AUTOMATED: CPT

## 2021-12-09 PROCEDURE — 86900 BLOOD TYPING SEROLOGIC ABO: CPT

## 2021-12-09 PROCEDURE — 86850 RBC ANTIBODY SCREEN: CPT

## 2021-12-09 PROCEDURE — 80048 BASIC METABOLIC PNL TOTAL CA: CPT

## 2021-12-11 LAB — MRSA CULTURE ONLY: NORMAL

## 2021-12-15 PROCEDURE — 88307 TISSUE EXAM BY PATHOLOGIST: CPT

## 2021-12-15 PROCEDURE — 88305 TISSUE EXAM BY PATHOLOGIST: CPT

## 2021-12-15 PROCEDURE — 88309 TISSUE EXAM BY PATHOLOGIST: CPT

## 2021-12-16 ENCOUNTER — HOSPITAL ENCOUNTER (OUTPATIENT)
Age: 76
Discharge: HOME | End: 2021-12-18

## 2021-12-16 LAB
ANION GAP SERPL CALCULATED.3IONS-SCNC: 12 MMOL/L (ref 7–16)
BUN BLDV-MCNC: 22 MG/DL (ref 6–23)
CALCIUM SERPL-MCNC: 8.1 MG/DL (ref 8.6–10.2)
CHLORIDE BLD-SCNC: 105 MMOL/L (ref 98–107)
CO2: 18 MMOL/L (ref 22–29)
CREAT SERPL-MCNC: 1 MG/DL (ref 0.7–1.2)
GFR AFRICAN AMERICAN: >60
GFR NON-AFRICAN AMERICAN: >60 ML/MIN/1.73
GLUCOSE BLD-MCNC: 128 MG/DL (ref 74–99)
HCT VFR BLD CALC: 37.9 % (ref 37–54)
HEMOGLOBIN: 12.1 G/DL (ref 12.5–16.5)
POTASSIUM SERPL-SCNC: 4.6 MMOL/L (ref 3.5–5)
SODIUM BLD-SCNC: 135 MMOL/L (ref 132–146)

## 2021-12-16 PROCEDURE — 80048 BASIC METABOLIC PNL TOTAL CA: CPT

## 2021-12-16 PROCEDURE — 85014 HEMATOCRIT: CPT

## 2021-12-16 PROCEDURE — 85018 HEMOGLOBIN: CPT

## 2023-06-07 ENCOUNTER — OFFICE VISIT (OUTPATIENT)
Dept: CARDIOLOGY CLINIC | Age: 78
End: 2023-06-07
Payer: MEDICARE

## 2023-06-07 VITALS
SYSTOLIC BLOOD PRESSURE: 126 MMHG | BODY MASS INDEX: 32.51 KG/M2 | HEIGHT: 73 IN | RESPIRATION RATE: 16 BRPM | OXYGEN SATURATION: 94 % | HEART RATE: 61 BPM | WEIGHT: 245.3 LBS | DIASTOLIC BLOOD PRESSURE: 50 MMHG

## 2023-06-07 DIAGNOSIS — I25.10 CORONARY ARTERY CALCIFICATION: ICD-10-CM

## 2023-06-07 DIAGNOSIS — I25.84 CORONARY ARTERY CALCIFICATION: ICD-10-CM

## 2023-06-07 DIAGNOSIS — Z86.79 HISTORY OF MYOCARDITIS: ICD-10-CM

## 2023-06-07 DIAGNOSIS — I51.7 LVH (LEFT VENTRICULAR HYPERTROPHY): ICD-10-CM

## 2023-06-07 DIAGNOSIS — I51.9 LEFT VENTRICULAR DIASTOLIC DYSFUNCTION: ICD-10-CM

## 2023-06-07 DIAGNOSIS — I10 PRIMARY HYPERTENSION: ICD-10-CM

## 2023-06-07 DIAGNOSIS — I77.819 AORTIC DILATATION (HCC): Primary | ICD-10-CM

## 2023-06-07 PROCEDURE — 3078F DIAST BP <80 MM HG: CPT | Performed by: INTERNAL MEDICINE

## 2023-06-07 PROCEDURE — 1123F ACP DISCUSS/DSCN MKR DOCD: CPT | Performed by: INTERNAL MEDICINE

## 2023-06-07 PROCEDURE — 93000 ELECTROCARDIOGRAM COMPLETE: CPT | Performed by: INTERNAL MEDICINE

## 2023-06-07 PROCEDURE — 99214 OFFICE O/P EST MOD 30 MIN: CPT | Performed by: INTERNAL MEDICINE

## 2023-06-07 PROCEDURE — G8427 DOCREV CUR MEDS BY ELIG CLIN: HCPCS | Performed by: INTERNAL MEDICINE

## 2023-06-07 PROCEDURE — 3074F SYST BP LT 130 MM HG: CPT | Performed by: INTERNAL MEDICINE

## 2023-06-07 PROCEDURE — G8417 CALC BMI ABV UP PARAM F/U: HCPCS | Performed by: INTERNAL MEDICINE

## 2023-06-07 PROCEDURE — 1036F TOBACCO NON-USER: CPT | Performed by: INTERNAL MEDICINE

## 2023-06-07 RX ORDER — COLCHICINE 0.6 MG/1
CAPSULE ORAL
COMMUNITY
Start: 2023-05-17

## 2023-06-07 RX ORDER — VERAPAMIL HYDROCHLORIDE 240 MG/1
TABLET, FILM COATED, EXTENDED RELEASE ORAL
COMMUNITY
Start: 2023-06-01

## 2023-06-07 RX ORDER — CELECOXIB 200 MG/1
CAPSULE ORAL
COMMUNITY
Start: 2023-05-17

## 2023-06-07 RX ORDER — TADALAFIL 5 MG/1
TABLET ORAL
COMMUNITY
Start: 2021-12-07

## 2023-06-07 NOTE — PROGRESS NOTES
Number of children: Not on file    Years of education: Not on file    Highest education level: Not on file   Occupational History    Not on file   Tobacco Use    Smoking status: Never    Smokeless tobacco: Never   Substance and Sexual Activity    Alcohol use: Yes     Comment: occasional     Drug use: No    Sexual activity: Not on file   Other Topics Concern    Not on file   Social History Narrative    Not on file     Social Determinants of Health     Financial Resource Strain: Not on file   Food Insecurity: Not on file   Transportation Needs: Not on file   Physical Activity: Not on file   Stress: Not on file   Social Connections: Not on file   Intimate Partner Violence: Not on file   Housing Stability: Not on file       No family history on file. Past Medical History:   Diagnosis Date    BPH (benign prostatic hyperplasia)     Cancer (HCC)     Cardiomyopathy ([facility] [address]     Hyperlipidemia     Hypertension     Left ventricular diastolic dysfunction     Prostate cancer (HCC)     Syncope and collapse        PHYSICAL EXAM:  CONSTITUTIONAL:  Well developed, well nourished    Vitals:    06/07/23 0737   BP: (!) 126/50   Pulse: 61   Resp: 16   SpO2: 94%   Weight: 245 lb 4.8 oz (111.3 kg)   Height: 6' 1\" (1.854 m)     HEAD & FACE: Normocephalic. Symmetric. EYES: No xanthelasma. Conjunctivae not injected. EARS, NOSE, MOUTH & THROAT: Good dentition. No oral pallor or cyanosis. NECK: No JVD at 30 degrees. No thyromegaly. RESPIRATORY: Clear to auscultation and percussion in all fields. No use of accessory muscle or intercostal retractions. CARDIOVASCULAR: Regular rate and rhythm. No lifts or thrills on palpitation. Auscultation with normal S1-S2 in intensity and splitting. No carotid bruits. Abdominal aorta not enlarged. Femoral arteries without bruits. Pedal pulses 2+. No edema. ABDOMEN: Soft without hepatic or splenic enlargement. No tenderness. MUSCULOSKELETAL: No kyphosis or scoliosis of the back.   Good

## 2023-09-27 ENCOUNTER — HOSPITAL ENCOUNTER (OUTPATIENT)
Age: 78
Discharge: HOME | End: 2023-09-27
Payer: MEDICARE

## 2023-09-27 DIAGNOSIS — I51.9 LEFT VENTRICULAR DIASTOLIC DYSFUNCTION: ICD-10-CM

## 2023-09-27 DIAGNOSIS — I51.9 LEFT VENTRICULAR DIASTOLIC DYSFUNCTION: Primary | ICD-10-CM

## 2023-09-27 LAB
ANION GAP SERPL CALCULATED.3IONS-SCNC: 14 MMOL/L (ref 7–16)
BUN SERPL-MCNC: 20 MG/DL (ref 6–23)
CALCIUM SERPL-MCNC: 8.9 MG/DL (ref 8.6–10.2)
CHLORIDE SERPL-SCNC: 103 MMOL/L (ref 98–107)
CO2 SERPL-SCNC: 21 MMOL/L (ref 22–29)
CREAT SERPL-MCNC: 1.3 MG/DL (ref 0.7–1.2)
GFR SERPL CREATININE-BSD FRML MDRD: 58 ML/MIN/1.73M2
GLUCOSE SERPL-MCNC: 91 MG/DL (ref 74–99)
POTASSIUM SERPL-SCNC: 4.2 MMOL/L (ref 3.5–5)
SODIUM SERPL-SCNC: 138 MMOL/L (ref 132–146)

## 2023-09-27 PROCEDURE — 36415 COLL VENOUS BLD VENIPUNCTURE: CPT

## 2023-09-27 PROCEDURE — 80048 BASIC METABOLIC PNL TOTAL CA: CPT

## 2023-09-28 ENCOUNTER — HOSPITAL ENCOUNTER (OUTPATIENT)
Dept: MRI IMAGING | Age: 78
Discharge: HOME | End: 2023-09-30
Attending: INTERNAL MEDICINE
Payer: MEDICARE

## 2023-09-28 DIAGNOSIS — I77.819 AORTIC DILATATION (HCC): ICD-10-CM

## 2023-09-28 PROCEDURE — A9579 GAD-BASE MR CONTRAST NOS,1ML: HCPCS | Performed by: RADIOLOGY

## 2023-09-28 PROCEDURE — 6360000004 HC RX CONTRAST MEDICATION: Performed by: RADIOLOGY

## 2023-09-28 PROCEDURE — 75561 CARDIAC MRI FOR MORPH W/DYE: CPT

## 2023-09-28 RX ADMIN — GADOTERIDOL 35 ML: 279.3 INJECTION, SOLUTION INTRAVENOUS at 08:47

## 2023-10-11 ENCOUNTER — TELEPHONE (OUTPATIENT)
Dept: CARDIOLOGY CLINIC | Age: 78
End: 2023-10-11

## 2024-08-21 ENCOUNTER — HOSPITAL ENCOUNTER (OUTPATIENT)
Dept: ULTRASOUND IMAGING | Age: 79
Discharge: HOME | End: 2024-08-23
Payer: MEDICARE

## 2024-08-21 ENCOUNTER — HOSPITAL ENCOUNTER (OUTPATIENT)
Dept: CT IMAGING | Age: 79
Discharge: HOME | End: 2024-08-23
Payer: MEDICARE

## 2024-08-21 DIAGNOSIS — R59.0 CERVICAL LYMPHADENOPATHY: ICD-10-CM

## 2024-08-21 DIAGNOSIS — R22.1 MASS OF LEFT SIDE OF NECK: ICD-10-CM

## 2024-08-21 PROCEDURE — 2580000003 HC RX 258: Performed by: RADIOLOGY

## 2024-08-21 PROCEDURE — 6360000004 HC RX CONTRAST MEDICATION: Performed by: RADIOLOGY

## 2024-08-21 PROCEDURE — 76536 US EXAM OF HEAD AND NECK: CPT

## 2024-08-21 PROCEDURE — 70491 CT SOFT TISSUE NECK W/DYE: CPT

## 2024-08-21 RX ORDER — SODIUM CHLORIDE 0.9 % (FLUSH) 0.9 %
10 SYRINGE (ML) INJECTION PRN
Status: DISCONTINUED | OUTPATIENT
Start: 2024-08-21 | End: 2024-08-24 | Stop reason: HOSPADM

## 2024-08-21 RX ADMIN — IOPAMIDOL 75 ML: 755 INJECTION, SOLUTION INTRAVENOUS at 09:43

## 2024-08-21 RX ADMIN — SODIUM CHLORIDE, PRESERVATIVE FREE 10 ML: 5 INJECTION INTRAVENOUS at 09:43

## 2024-09-04 ENCOUNTER — HOSPITAL ENCOUNTER (OUTPATIENT)
Dept: ULTRASOUND IMAGING | Age: 79
Discharge: HOME | End: 2024-09-06
Payer: MEDICARE

## 2024-09-04 DIAGNOSIS — R22.1 MASS OF LEFT SIDE OF NECK: ICD-10-CM

## 2024-09-04 PROCEDURE — 88177 CYTP FNA EVAL EA ADDL: CPT

## 2024-09-04 PROCEDURE — 88172 CYTP DX EVAL FNA 1ST EA SITE: CPT

## 2024-09-04 PROCEDURE — 88305 TISSUE EXAM BY PATHOLOGIST: CPT

## 2024-09-04 PROCEDURE — 88173 CYTOPATH EVAL FNA REPORT: CPT

## 2024-09-04 PROCEDURE — 2709999900 US FINE NEEDLE ASPIRATION

## 2024-09-04 NOTE — DISCHARGE INSTRUCTIONS
Discharge Instructions for Needle Biopsy:    A needle biopsy is done to remove a small sample of tissue . The sample is then tested to see if there are malignant (cancerous) or benign (noncancerous) cells.   There are two types of biopsies:   Fine-needle aspiration (FNA)the most common type    Coarse-needle biopsy (CNB)   This procedure is usually done in the doctor's office, but may also be done as an outpatient procedure at the hospital.   Steps to Take   Home Care    In most cases, you can return home or to work without any negative effects.   After a FNA, you should not have any pain or tenderness. For a CNB, you may feel soreness at the site of the biopsy for 1-2 days after the test.   For a FNA, remove the bandage after a few hours.    For a CNB, remove the bandage after a few days. Ask your doctor if you can shower and bathe.    Diet    Resume your normal diet.    Physical Activity    Avoid vigorous physical activity for 24 hours.    Ask your doctor when it is safe for you to drive.    Medications    If you had to stop medicines before the procedure, ask your doctor when you can start again. Medicines often stopped include:   Anti-inflammatory drugs (eg, aspirin )   Blood thinners like clopidogrel (Plavix) or warfarin (Coumadin)   Your doctor may tell you to take an over-the-counter pain medicine, such as acetaminophen.   If you are taking medications, follow these general guidelines:   Take your medication as directed. Do not change the amount or the schedule.   Do not stop taking them without talking to your doctor.   Do not share them.   Know what the results and side effects. Report them to your doctor.   Some drugs can be dangerous when mixed. Talk to a doctor or pharmacist if you are taking more than one drug. This includes over-the-counter medication and herb or dietary supplements.   Plan ahead for refills so you don't run out.      Follow-up   If cancer is found, you will be referred to a  surgeon.

## 2024-09-05 LAB — NON-GYN CYTOLOGY REPORT: NORMAL

## 2024-10-04 ENCOUNTER — TELEPHONE (OUTPATIENT)
Dept: CARDIOLOGY CLINIC | Age: 79
End: 2024-10-04

## 2024-10-04 NOTE — TELEPHONE ENCOUNTER
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 Please advise.

## 2024-10-08 ENCOUNTER — ANESTHESIA EVENT (OUTPATIENT)
Dept: OPERATING ROOM | Age: 79
End: 2024-10-08
Payer: MEDICARE

## 2024-10-09 ENCOUNTER — HOSPITAL ENCOUNTER (OUTPATIENT)
Dept: PREADMISSION TESTING | Age: 79
Discharge: HOME | End: 2024-10-09
Payer: MEDICARE

## 2024-10-09 LAB
ANION GAP SERPL CALCULATED.3IONS-SCNC: 9 MMOL/L (ref 7–16)
BUN SERPL-MCNC: 21 MG/DL (ref 6–23)
CALCIUM SERPL-MCNC: 8.6 MG/DL (ref 8.6–10.2)
CHLORIDE SERPL-SCNC: 107 MMOL/L (ref 98–107)
CO2 SERPL-SCNC: 22 MMOL/L (ref 22–29)
CREAT SERPL-MCNC: 1.1 MG/DL (ref 0.7–1.2)
EKG ATRIAL RATE: 55 BPM
EKG P AXIS: 54 DEGREES
EKG P-R INTERVAL: 238 MS
EKG Q-T INTERVAL: 432 MS
EKG QRS DURATION: 94 MS
EKG QTC CALCULATION (BAZETT): 413 MS
EKG R AXIS: 28 DEGREES
EKG T AXIS: 32 DEGREES
EKG VENTRICULAR RATE: 55 BPM
ERYTHROCYTE [DISTWIDTH] IN BLOOD BY AUTOMATED COUNT: 14 % (ref 11.5–15)
GFR, ESTIMATED: 72 ML/MIN/1.73M2
GLUCOSE SERPL-MCNC: 97 MG/DL (ref 74–99)
HCT VFR BLD AUTO: 43.7 % (ref 37–54)
HGB BLD-MCNC: 13.8 G/DL (ref 12.5–16.5)
MCH RBC QN AUTO: 29.6 PG (ref 26–35)
MCHC RBC AUTO-ENTMCNC: 31.6 G/DL (ref 32–34.5)
MCV RBC AUTO: 93.6 FL (ref 80–99.9)
PLATELET # BLD AUTO: 265 K/UL (ref 130–450)
PMV BLD AUTO: 10.3 FL (ref 7–12)
POTASSIUM SERPL-SCNC: 4.4 MMOL/L (ref 3.5–5)
RBC # BLD AUTO: 4.67 M/UL (ref 3.8–5.8)
SODIUM SERPL-SCNC: 138 MMOL/L (ref 132–146)
WBC OTHER # BLD: 8.8 K/UL (ref 4.5–11.5)

## 2024-10-09 PROCEDURE — 36415 COLL VENOUS BLD VENIPUNCTURE: CPT

## 2024-10-09 PROCEDURE — 85027 COMPLETE CBC AUTOMATED: CPT

## 2024-10-09 PROCEDURE — 80048 BASIC METABOLIC PNL TOTAL CA: CPT

## 2024-10-09 NOTE — PROGRESS NOTES
DR. Chan notified of Cardiac Clearance from  . He sees  every 2 years. Last visit 6/2023. He is not having any chest pain or SOB.

## 2024-10-09 NOTE — PROGRESS NOTES
EKG (unconfirmed) done in [facility] reviewed with Dr. Chan. No further preoperative needs identified at this time.

## 2024-10-09 NOTE — PROGRESS NOTES
[facility] PRE-ADMISSION TESTING INSTRUCTIONS    The Preadmission Testing patient is instructed accordingly using the following criteria (check applicable):    ARRIVAL INSTRUCTIONS:  [x] Parking the day of Surgery is located in the Main Entrance lot.  Upon entering the door, make an immediate right to the surgery reception desk    [x] Bring photo ID and insurance card    [] Bring in a copy of Living will or Durable Power of  papers.    [x] Please be sure to arrange for responsible adult to provide transportation to and from the hospital    [x] Please arrange for responsible adult to be with you for the 24 hour period post procedure due to having anesthesia    [x] If you awake am of surgery not feeling well or have temperature >100 please call [phone]    GENERAL INSTRUCTIONS:    [x] May have clear liquids until 4 hours prior to surgery. Examples include water, fruit juices (no pulp), jello, popsicles, black coffee or tea, beef or chicken broth.               No gum, candy or mints.    [x] You may brush your teeth, but do not swallow any water    [x] Take medications as instructed with 1-2 oz of water. VERAPAMIL AND OMEPRAZOLE    [x] Stop herbal supplements and vitamins 5 days prior to procedure HOLD ASA    [x] Follow preop dosing of blood thinners per physician instructions    [] Take 1/2 dose of evening insulin, but no insulin after midnight    [] No oral diabetic medications after midnight    [] If diabetic and have low blood sugar or feel symptomatic, take 1-2oz apple juice only    [] Bring inhalers day of surgery    [] Bring C-PAP/ Bi-Pap day of surgery    [] Bring urine specimen day of surgery    [x] Shower or bath with soap, lather and rinse well, AM of Surgery, no lotion, powders or creams to surgical site    [] Follow bowel prep as instructed per surgeon    [x] No tobacco products within 24 hours of surgery     [x] No alcohol or illegal drug use within 24 hours of  surgery.    [x] Jewelry, body piercing's, eyeglasses, contact lenses and dentures are not permitted into surgery (bring cases)      [] Please do not wear any nail polish, make up or hair products on the day of surgery    [x] You can expect a call the business day prior to procedure to notify you if your arrival time changes    [x] If you receive a survey after surgery we would greatly appreciate your comments    [] Parent/guardian of a minor must accompany their child and remain on the premises  the entire time they are under our care     [] Pediatric patients may bring favorite toy, blanket or comfort item with them    [] A caregiver or family member must remain with the patient during their stay if they are mentally handicapped, have dementia, disoriented or unable to use a call light or would be a safety concern if left unattended    [x] Please notify surgeon if you develop any illness between now and time of surgery (cold, cough, sore throat, fever, nausea, vomiting) or any signs of infections  including skin, wounds, and dental.    []  The Outpatient Pharmacy is available to fill your prescription here on your day of surgery, ask your preop nurse for details    [] Other instructions    EDUCATIONAL MATERIALS PROVIDED:    [] PAT Preoperative Education Packet/Booklet     [] Medication List    [] Transfusion bracelet given to pt. Pt instructed to place on wrist or bring to hospital day of surgery. Informed that if bracelet lost or forgotten at home, bloodwork will have to repeated which could cause a delay in surgery.    [] Shower with soap, lather and rinse well, and use CHG wipes provided the evening before surgery as instructed    [] Incentive spirometer with instructions

## 2024-10-11 ENCOUNTER — ANESTHESIA (OUTPATIENT)
Dept: OPERATING ROOM | Age: 79
End: 2024-10-11
Payer: MEDICARE

## 2024-10-11 ENCOUNTER — HOSPITAL ENCOUNTER (OUTPATIENT)
Age: 79
Setting detail: OBSERVATION
Discharge: HOME | End: 2024-10-12
Attending: OTOLARYNGOLOGY | Admitting: OTOLARYNGOLOGY
Payer: MEDICARE

## 2024-10-11 DIAGNOSIS — G89.18 POST-OP PAIN: Primary | ICD-10-CM

## 2024-10-11 DIAGNOSIS — D11.0 BENIGN TUMOR OF PAROTID GLAND: ICD-10-CM

## 2024-10-11 PROBLEM — K11.8 PAROTID MASS: Status: ACTIVE | Noted: 2024-10-11

## 2024-10-11 PROCEDURE — 2709999900 HC NON-CHARGEABLE SUPPLY: Performed by: OTOLARYNGOLOGY

## 2024-10-11 PROCEDURE — 7100000011 HC PHASE II RECOVERY - ADDTL 15 MIN: Performed by: OTOLARYNGOLOGY

## 2024-10-11 PROCEDURE — G0378 HOSPITAL OBSERVATION PER HR: HCPCS

## 2024-10-11 PROCEDURE — 7100000010 HC PHASE II RECOVERY - FIRST 15 MIN: Performed by: OTOLARYNGOLOGY

## 2024-10-11 PROCEDURE — 6370000000 HC RX 637 (ALT 250 FOR IP)

## 2024-10-11 PROCEDURE — 6370000000 HC RX 637 (ALT 250 FOR IP): Performed by: ANESTHESIOLOGY

## 2024-10-11 PROCEDURE — 6360000002 HC RX W HCPCS: Performed by: NURSE ANESTHETIST, CERTIFIED REGISTERED

## 2024-10-11 PROCEDURE — 2500000003 HC RX 250 WO HCPCS: Performed by: NURSE ANESTHETIST, CERTIFIED REGISTERED

## 2024-10-11 PROCEDURE — 3700000000 HC ANESTHESIA ATTENDED CARE: Performed by: OTOLARYNGOLOGY

## 2024-10-11 PROCEDURE — 7100000000 HC PACU RECOVERY - FIRST 15 MIN: Performed by: OTOLARYNGOLOGY

## 2024-10-11 PROCEDURE — 2500000003 HC RX 250 WO HCPCS: Performed by: OTOLARYNGOLOGY

## 2024-10-11 PROCEDURE — 88307 TISSUE EXAM BY PATHOLOGIST: CPT

## 2024-10-11 PROCEDURE — 88331 PATH CONSLTJ SURG 1 BLK 1SPC: CPT

## 2024-10-11 PROCEDURE — 2580000003 HC RX 258: Performed by: OTOLARYNGOLOGY

## 2024-10-11 PROCEDURE — 88332 PATH CONSLTJ SURG EA ADD BLK: CPT

## 2024-10-11 PROCEDURE — 2580000003 HC RX 258

## 2024-10-11 PROCEDURE — 7100000001 HC PACU RECOVERY - ADDTL 15 MIN: Performed by: OTOLARYNGOLOGY

## 2024-10-11 PROCEDURE — 6360000002 HC RX W HCPCS

## 2024-10-11 PROCEDURE — L0150 CERV SEMI-RIG ADJ MOLDED CHN: HCPCS | Performed by: OTOLARYNGOLOGY

## 2024-10-11 PROCEDURE — 6360000002 HC RX W HCPCS: Performed by: OTOLARYNGOLOGY

## 2024-10-11 PROCEDURE — 3600000003 HC SURGERY LEVEL 3 BASE: Performed by: OTOLARYNGOLOGY

## 2024-10-11 PROCEDURE — 3600000013 HC SURGERY LEVEL 3 ADDTL 15MIN: Performed by: OTOLARYNGOLOGY

## 2024-10-11 PROCEDURE — 2500000003 HC RX 250 WO HCPCS

## 2024-10-11 PROCEDURE — 2720000010 HC SURG SUPPLY STERILE: Performed by: OTOLARYNGOLOGY

## 2024-10-11 PROCEDURE — 3700000001 HC ADD 15 MINUTES (ANESTHESIA): Performed by: OTOLARYNGOLOGY

## 2024-10-11 RX ORDER — ONDANSETRON 4 MG/1
4 TABLET, ORALLY DISINTEGRATING ORAL 3 TIMES DAILY PRN
Qty: 9 TABLET | Refills: 0 | Status: SHIPPED | OUTPATIENT
Start: 2024-10-11 | End: 2024-10-14

## 2024-10-11 RX ORDER — ONDANSETRON 2 MG/ML
INJECTION INTRAMUSCULAR; INTRAVENOUS
Status: DISCONTINUED | OUTPATIENT
Start: 2024-10-11 | End: 2024-10-11 | Stop reason: SDUPTHER

## 2024-10-11 RX ORDER — SODIUM CHLORIDE 9 MG/ML
INJECTION, SOLUTION INTRAVENOUS PRN
Status: DISCONTINUED | OUTPATIENT
Start: 2024-10-11 | End: 2024-10-11 | Stop reason: HOSPADM

## 2024-10-11 RX ORDER — PROPOFOL 10 MG/ML
INJECTION, EMULSION INTRAVENOUS
Status: DISCONTINUED | OUTPATIENT
Start: 2024-10-11 | End: 2024-10-11 | Stop reason: SDUPTHER

## 2024-10-11 RX ORDER — MEPERIDINE HYDROCHLORIDE 25 MG/ML
12.5 INJECTION INTRAMUSCULAR; INTRAVENOUS; SUBCUTANEOUS EVERY 5 MIN PRN
Status: DISCONTINUED | OUTPATIENT
Start: 2024-10-11 | End: 2024-10-11 | Stop reason: HOSPADM

## 2024-10-11 RX ORDER — DEXAMETHASONE SODIUM PHOSPHATE 4 MG/ML
INJECTION, SOLUTION INTRA-ARTICULAR; INTRALESIONAL; INTRAMUSCULAR; INTRAVENOUS; SOFT TISSUE
Status: DISCONTINUED | OUTPATIENT
Start: 2024-10-11 | End: 2024-10-11 | Stop reason: SDUPTHER

## 2024-10-11 RX ORDER — FAMOTIDINE 10 MG/ML
INJECTION, SOLUTION INTRAVENOUS
Status: DISCONTINUED | OUTPATIENT
Start: 2024-10-11 | End: 2024-10-11 | Stop reason: SDUPTHER

## 2024-10-11 RX ORDER — ONDANSETRON 2 MG/ML
4 INJECTION INTRAMUSCULAR; INTRAVENOUS EVERY 6 HOURS PRN
Status: DISCONTINUED | OUTPATIENT
Start: 2024-10-11 | End: 2024-10-12 | Stop reason: HOSPADM

## 2024-10-11 RX ORDER — ACETAMINOPHEN 500 MG
1000 TABLET ORAL ONCE
Status: COMPLETED | OUTPATIENT
Start: 2024-10-11 | End: 2024-10-11

## 2024-10-11 RX ORDER — LIDOCAINE HYDROCHLORIDE 20 MG/ML
INJECTION, SOLUTION EPIDURAL; INFILTRATION; INTRACAUDAL; PERINEURAL
Status: DISCONTINUED | OUTPATIENT
Start: 2024-10-11 | End: 2024-10-11 | Stop reason: SDUPTHER

## 2024-10-11 RX ORDER — SUCCINYLCHOLINE CHLORIDE 20 MG/ML
INJECTION INTRAMUSCULAR; INTRAVENOUS
Status: DISCONTINUED | OUTPATIENT
Start: 2024-10-11 | End: 2024-10-11 | Stop reason: SDUPTHER

## 2024-10-11 RX ORDER — OXYCODONE HYDROCHLORIDE 5 MG/1
5 TABLET ORAL EVERY 4 HOURS PRN
Status: DISCONTINUED | OUTPATIENT
Start: 2024-10-11 | End: 2024-10-12 | Stop reason: HOSPADM

## 2024-10-11 RX ORDER — SODIUM CHLORIDE 0.9 % (FLUSH) 0.9 %
5-40 SYRINGE (ML) INJECTION PRN
Status: DISCONTINUED | OUTPATIENT
Start: 2024-10-11 | End: 2024-10-11 | Stop reason: HOSPADM

## 2024-10-11 RX ORDER — DIPHENHYDRAMINE HYDROCHLORIDE 50 MG/ML
12.5 INJECTION INTRAMUSCULAR; INTRAVENOUS
Status: DISCONTINUED | OUTPATIENT
Start: 2024-10-11 | End: 2024-10-11 | Stop reason: HOSPADM

## 2024-10-11 RX ORDER — FAMOTIDINE 10 MG/ML
INJECTION, SOLUTION INTRAVENOUS
Status: COMPLETED
Start: 2024-10-11 | End: 2024-10-11

## 2024-10-11 RX ORDER — ACETAMINOPHEN 325 MG/1
650 TABLET ORAL EVERY 4 HOURS PRN
Status: DISCONTINUED | OUTPATIENT
Start: 2024-10-11 | End: 2024-10-11 | Stop reason: ALTCHOICE

## 2024-10-11 RX ORDER — SODIUM CHLORIDE 0.9 % (FLUSH) 0.9 %
5-40 SYRINGE (ML) INJECTION EVERY 12 HOURS SCHEDULED
Status: DISCONTINUED | OUTPATIENT
Start: 2024-10-11 | End: 2024-10-11 | Stop reason: HOSPADM

## 2024-10-11 RX ORDER — VERAPAMIL HYDROCHLORIDE 120 MG/1
240 TABLET, FILM COATED, EXTENDED RELEASE ORAL NIGHTLY
Status: DISCONTINUED | OUTPATIENT
Start: 2024-10-12 | End: 2024-10-12 | Stop reason: HOSPADM

## 2024-10-11 RX ORDER — SODIUM CHLORIDE 0.9 % (FLUSH) 0.9 %
5-40 SYRINGE (ML) INJECTION PRN
Status: DISCONTINUED | OUTPATIENT
Start: 2024-10-11 | End: 2024-10-12 | Stop reason: HOSPADM

## 2024-10-11 RX ORDER — ROCURONIUM BROMIDE 10 MG/ML
INJECTION, SOLUTION INTRAVENOUS
Status: DISCONTINUED | OUTPATIENT
Start: 2024-10-11 | End: 2024-10-11 | Stop reason: SDUPTHER

## 2024-10-11 RX ORDER — OXYCODONE HYDROCHLORIDE 5 MG/1
10 TABLET ORAL PRN
Status: COMPLETED | OUTPATIENT
Start: 2024-10-11 | End: 2024-10-11

## 2024-10-11 RX ORDER — ONDANSETRON 4 MG/1
4 TABLET, ORALLY DISINTEGRATING ORAL EVERY 8 HOURS PRN
Status: DISCONTINUED | OUTPATIENT
Start: 2024-10-11 | End: 2024-10-12 | Stop reason: HOSPADM

## 2024-10-11 RX ORDER — SODIUM CHLORIDE, SODIUM LACTATE, POTASSIUM CHLORIDE, CALCIUM CHLORIDE 600; 310; 30; 20 MG/100ML; MG/100ML; MG/100ML; MG/100ML
INJECTION, SOLUTION INTRAVENOUS CONTINUOUS
Status: DISCONTINUED | OUTPATIENT
Start: 2024-10-11 | End: 2024-10-11 | Stop reason: HOSPADM

## 2024-10-11 RX ORDER — LIDOCAINE HYDROCHLORIDE AND EPINEPHRINE 10; 10 MG/ML; UG/ML
INJECTION, SOLUTION INFILTRATION; PERINEURAL PRN
Status: DISCONTINUED | OUTPATIENT
Start: 2024-10-11 | End: 2024-10-11 | Stop reason: ALTCHOICE

## 2024-10-11 RX ORDER — SODIUM CHLORIDE 9 MG/ML
INJECTION, SOLUTION INTRAVENOUS PRN
Status: DISCONTINUED | OUTPATIENT
Start: 2024-10-11 | End: 2024-10-12 | Stop reason: HOSPADM

## 2024-10-11 RX ORDER — NALOXONE HYDROCHLORIDE 0.4 MG/ML
INJECTION, SOLUTION INTRAMUSCULAR; INTRAVENOUS; SUBCUTANEOUS PRN
Status: DISCONTINUED | OUTPATIENT
Start: 2024-10-11 | End: 2024-10-11 | Stop reason: HOSPADM

## 2024-10-11 RX ORDER — ACETAMINOPHEN 325 MG/1
650 TABLET ORAL EVERY 4 HOURS PRN
Status: DISCONTINUED | OUTPATIENT
Start: 2024-10-11 | End: 2024-10-12 | Stop reason: HOSPADM

## 2024-10-11 RX ORDER — KETAMINE HYDROCHLORIDE 10 MG/ML
INJECTION, SOLUTION INTRAMUSCULAR; INTRAVENOUS
Status: DISCONTINUED | OUTPATIENT
Start: 2024-10-11 | End: 2024-10-11 | Stop reason: SDUPTHER

## 2024-10-11 RX ORDER — PANTOPRAZOLE SODIUM 40 MG/1
40 TABLET, DELAYED RELEASE ORAL
Status: DISCONTINUED | OUTPATIENT
Start: 2024-10-12 | End: 2024-10-12 | Stop reason: HOSPADM

## 2024-10-11 RX ORDER — METOCLOPRAMIDE HYDROCHLORIDE 5 MG/ML
INJECTION INTRAMUSCULAR; INTRAVENOUS
Status: DISCONTINUED | OUTPATIENT
Start: 2024-10-11 | End: 2024-10-11 | Stop reason: SDUPTHER

## 2024-10-11 RX ORDER — HYDROCODONE BITARTRATE AND ACETAMINOPHEN 5; 325 MG/1; MG/1
1 TABLET ORAL EVERY 6 HOURS PRN
Qty: 12 TABLET | Refills: 0 | Status: SHIPPED | OUTPATIENT
Start: 2024-10-11 | End: 2024-10-14

## 2024-10-11 RX ORDER — POLYETHYLENE GLYCOL 3350 17 G/17G
17 POWDER, FOR SOLUTION ORAL DAILY PRN
Status: DISCONTINUED | OUTPATIENT
Start: 2024-10-11 | End: 2024-10-12 | Stop reason: HOSPADM

## 2024-10-11 RX ORDER — ATORVASTATIN CALCIUM 10 MG/1
10 TABLET, FILM COATED ORAL NIGHTLY
Status: DISCONTINUED | OUTPATIENT
Start: 2024-10-11 | End: 2024-10-12 | Stop reason: HOSPADM

## 2024-10-11 RX ORDER — LOSARTAN POTASSIUM 50 MG/1
100 TABLET ORAL DAILY
Status: DISCONTINUED | OUTPATIENT
Start: 2024-10-11 | End: 2024-10-12 | Stop reason: HOSPADM

## 2024-10-11 RX ORDER — SODIUM CHLORIDE 0.9 % (FLUSH) 0.9 %
5-40 SYRINGE (ML) INJECTION EVERY 12 HOURS SCHEDULED
Status: DISCONTINUED | OUTPATIENT
Start: 2024-10-11 | End: 2024-10-12 | Stop reason: HOSPADM

## 2024-10-11 RX ORDER — CEPHALEXIN 500 MG/1
500 CAPSULE ORAL 2 TIMES DAILY
Qty: 14 CAPSULE | Refills: 0 | Status: SHIPPED | OUTPATIENT
Start: 2024-10-11 | End: 2024-10-18

## 2024-10-11 RX ORDER — FENTANYL CITRATE 50 UG/ML
INJECTION, SOLUTION INTRAMUSCULAR; INTRAVENOUS
Status: DISCONTINUED | OUTPATIENT
Start: 2024-10-11 | End: 2024-10-11 | Stop reason: SDUPTHER

## 2024-10-11 RX ORDER — MIDAZOLAM HYDROCHLORIDE 1 MG/ML
INJECTION INTRAMUSCULAR; INTRAVENOUS
Status: DISCONTINUED | OUTPATIENT
Start: 2024-10-11 | End: 2024-10-11 | Stop reason: SDUPTHER

## 2024-10-11 RX ORDER — EPHEDRINE SULFATE/0.9% NACL/PF 25 MG/5 ML
SYRINGE (ML) INTRAVENOUS
Status: DISCONTINUED | OUTPATIENT
Start: 2024-10-11 | End: 2024-10-11 | Stop reason: SDUPTHER

## 2024-10-11 RX ORDER — GLYCOPYRROLATE 0.2 MG/ML
INJECTION INTRAMUSCULAR; INTRAVENOUS
Status: DISCONTINUED | OUTPATIENT
Start: 2024-10-11 | End: 2024-10-11 | Stop reason: SDUPTHER

## 2024-10-11 RX ORDER — BACITRACIN ZINC 500 [USP'U]/G
OINTMENT TOPICAL PRN
Status: DISCONTINUED | OUTPATIENT
Start: 2024-10-11 | End: 2024-10-11 | Stop reason: ALTCHOICE

## 2024-10-11 RX ORDER — OXYCODONE HYDROCHLORIDE 5 MG/1
5 TABLET ORAL PRN
Status: COMPLETED | OUTPATIENT
Start: 2024-10-11 | End: 2024-10-11

## 2024-10-11 RX ADMIN — FENTANYL CITRATE 100 MCG: 50 INJECTION, SOLUTION INTRAMUSCULAR; INTRAVENOUS at 06:57

## 2024-10-11 RX ADMIN — PROPOFOL 150 MG: 10 INJECTION, EMULSION INTRAVENOUS at 06:57

## 2024-10-11 RX ADMIN — EPHEDRINE SULFATE 5 MG: 5 INJECTION INTRAVENOUS at 08:12

## 2024-10-11 RX ADMIN — ACETAMINOPHEN 650 MG: 325 TABLET ORAL at 17:54

## 2024-10-11 RX ADMIN — PROPOFOL 30 MG: 10 INJECTION, EMULSION INTRAVENOUS at 08:25

## 2024-10-11 RX ADMIN — EPHEDRINE SULFATE 5 MG: 5 INJECTION INTRAVENOUS at 07:22

## 2024-10-11 RX ADMIN — ONDANSETRON 4 MG: 2 INJECTION INTRAMUSCULAR; INTRAVENOUS at 09:06

## 2024-10-11 RX ADMIN — DEXAMETHASONE SODIUM PHOSPHATE 8 MG: 4 INJECTION, SOLUTION INTRAMUSCULAR; INTRAVENOUS at 07:08

## 2024-10-11 RX ADMIN — WATER 2000 MG: 1 INJECTION INTRAMUSCULAR; INTRAVENOUS; SUBCUTANEOUS at 07:02

## 2024-10-11 RX ADMIN — LIDOCAINE HYDROCHLORIDE 100 MG: 20 INJECTION, SOLUTION EPIDURAL; INFILTRATION; INTRACAUDAL; PERINEURAL at 06:57

## 2024-10-11 RX ADMIN — PROPOFOL 30 MG: 10 INJECTION, EMULSION INTRAVENOUS at 07:33

## 2024-10-11 RX ADMIN — SUGAMMADEX 50 MG: 100 INJECTION, SOLUTION INTRAVENOUS at 09:06

## 2024-10-11 RX ADMIN — WATER 2000 MG: 1 INJECTION INTRAMUSCULAR; INTRAVENOUS; SUBCUTANEOUS at 18:24

## 2024-10-11 RX ADMIN — MIDAZOLAM 2 MG: 1 INJECTION INTRAMUSCULAR; INTRAVENOUS at 06:52

## 2024-10-11 RX ADMIN — OXYCODONE 5 MG: 5 TABLET ORAL at 12:50

## 2024-10-11 RX ADMIN — KETAMINE HYDROCHLORIDE 10 MG: 10 INJECTION INTRAMUSCULAR; INTRAVENOUS at 08:31

## 2024-10-11 RX ADMIN — ACETAMINOPHEN 1000 MG: 500 TABLET ORAL at 06:43

## 2024-10-11 RX ADMIN — SODIUM CHLORIDE, PRESERVATIVE FREE 10 ML: 5 INJECTION INTRAVENOUS at 21:38

## 2024-10-11 RX ADMIN — SUCCINYLCHOLINE CHLORIDE 160 MG: 20 INJECTION, SOLUTION INTRAMUSCULAR; INTRAVENOUS at 06:57

## 2024-10-11 RX ADMIN — GLYCOPYRROLATE 0.2 MG: 0.2 INJECTION, SOLUTION INTRAMUSCULAR; INTRAVENOUS at 07:17

## 2024-10-11 RX ADMIN — ATORVASTATIN CALCIUM 10 MG: 10 TABLET, FILM COATED ORAL at 21:30

## 2024-10-11 RX ADMIN — SODIUM CHLORIDE: 9 INJECTION, SOLUTION INTRAVENOUS at 09:18

## 2024-10-11 RX ADMIN — SODIUM CHLORIDE: 9 INJECTION, SOLUTION INTRAVENOUS at 06:50

## 2024-10-11 RX ADMIN — ROCURONIUM BROMIDE 50 MG: 10 INJECTION, SOLUTION INTRAVENOUS at 06:57

## 2024-10-11 RX ADMIN — METOCLOPRAMIDE 10 MG: 5 INJECTION, SOLUTION INTRAMUSCULAR; INTRAVENOUS at 08:28

## 2024-10-11 RX ADMIN — FAMOTIDINE 20 MG: 10 INJECTION, SOLUTION INTRAVENOUS at 07:41

## 2024-10-11 RX ADMIN — KETAMINE HYDROCHLORIDE 40 MG: 10 INJECTION INTRAMUSCULAR; INTRAVENOUS at 06:57

## 2024-10-11 RX ADMIN — LOSARTAN POTASSIUM 100 MG: 50 TABLET, FILM COATED ORAL at 21:30

## 2024-10-11 ASSESSMENT — PAIN - FUNCTIONAL ASSESSMENT
PAIN_FUNCTIONAL_ASSESSMENT: PREVENTS OR INTERFERES SOME ACTIVE ACTIVITIES AND ADLS
PAIN_FUNCTIONAL_ASSESSMENT: 0-10
PAIN_FUNCTIONAL_ASSESSMENT: PREVENTS OR INTERFERES SOME ACTIVE ACTIVITIES AND ADLS
PAIN_FUNCTIONAL_ASSESSMENT: 0-10
PAIN_FUNCTIONAL_ASSESSMENT: 0-10

## 2024-10-11 ASSESSMENT — PAIN DESCRIPTION - PAIN TYPE
TYPE: ACUTE PAIN
TYPE: SURGICAL PAIN
TYPE: SURGICAL PAIN

## 2024-10-11 ASSESSMENT — PAIN DESCRIPTION - LOCATION
LOCATION: NECK
LOCATION: FACE
LOCATION: NECK
LOCATION: NECK

## 2024-10-11 ASSESSMENT — PAIN SCALES - GENERAL
PAINLEVEL_OUTOF10: 3
PAINLEVEL_OUTOF10: 1
PAINLEVEL_OUTOF10: 6
PAINLEVEL_OUTOF10: 7
PAINLEVEL_OUTOF10: 4
PAINLEVEL_OUTOF10: 0

## 2024-10-11 ASSESSMENT — PAIN DESCRIPTION - DESCRIPTORS
DESCRIPTORS: ACHING;SORE
DESCRIPTORS: ACHING
DESCRIPTORS: DISCOMFORT
DESCRIPTORS: ACHING;DISCOMFORT

## 2024-10-11 ASSESSMENT — PAIN DESCRIPTION - ORIENTATION
ORIENTATION: LEFT

## 2024-10-11 NOTE — DISCHARGE INSTRUCTIONS
DISCHARGE INSTRUCTIONS    Call our office for any questions/concerns and for follow up appointment    Please follow the instructions checked below:    ACTIVITY INSTRUCTIONS:  [x]Increase activity as tolerated    [x]No heavy lifting or strenuous activity     [x]No driving while taking pain medication    WOUND/DRESSING INSTRUCTIONS:  [x]May shower      [x]No sitting in bath tub, hot tub or swimming.   [x]Ice to areas of pain for first 24 hours.      [x]Your wound was sealed with a coat mastisol and steri strips. All the sutures are underneath the skin. The steri strips will be removed in the office at your follow up appointment.       MEDICATION INSTRUCTIONS:  [x]Take medication as prescribed.  [x]When taking pain medications, you may experience dizziness or drowsiness.  Do not drink alcohol or drive when taking these medications.  [x]You may take Ibuprofen (over the counter) as per directions for mild pain.  [x]Do not take any other acetaminophen (Tylenol) products while taking your pain medication.  [x]You may experience constipation while taking pain medication - You may take over the counter stool softeners: docuscate (Colace) or sennosides S (Senokot - S).     WORK:  []You may return to work without restrictions   [x]You may not return to work until after follow up appointment with your physcian    Call physician or go to the ER for any of the following or for questions/concerns:   Fever over 101° F    Redness, swelling, hardness or warmth at the wound site (s)  Unrelieved nausea/vomiting    Foul smelling or cloudy drainage at the wound site (s)   Unrelieved pain or increase in pain     Increase in shortness of breath

## 2024-10-11 NOTE — OP NOTE
Operative Note      Patient: Schuyler Altman  YOB: 1945  MRN: [id number]    Date of Procedure: 10/11/2024    Pre-op diagnosis: Left parotid mass    Post-Op Diagnosis: Same       Procedure(s):  EXCISION LEFT PAROTID MASS    Surgeon(s):  Kieran Meehan Jr., MD    Assistant:   Resident: Jerome Garvin DO    Anesthesia: General    Estimated Blood Loss (mL): 30 cc    Complications: None    Specimens:   ID Type Source Tests Collected by Time Destination   A : LEFT PAROTID MASS Tissue Tissue SURGICAL PATHOLOGY Kieran Meehan Jr., MD 10/11/2024 0850        Implants:  * No implants in log *      Drains:   Closed/Suction Drain Left Other (Comment) Bulb (Active)   Site Description Clean, dry & intact 10/11/24 0950   Drainage Appearance Bright red 10/11/24 0950       Findings: left parotid mass    Detailed Description of Procedure:     Indication: PT presented with a history of enlarging left parotid mass 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 Recommendation was for left parotidectomy with nerve integrity monitor. Risks, benefits and alternatives were thoroughly discussed with the patient including but not limited to risk of bleeding, infection, scarring, damage to surrounding structures, scarring, asymmetry and need for further procedures. He verbalized understanding and agrees to proceed.     Procedure:  The patient was consented preoperatively, taken back to the   operating room, identified appropriately, placed in a supine position, given   anesthesia for general intubation. Once he was intubated, the neck was   turned to the right and a shoulder roll was placed under the patient's neck.   Nerve integrity monitors were placed on the patient's forehead, supraorbital   muscle, and then orbicularis oris muscles for monitoring of the facial nerve.   The patient was then prepped and draped in a sterile fashion with clear   visualization of the patient's left lower lip and left eye. Once the  patient was prepped and draped, injection of 1% lidocaine with epinephrine, about 5 mL were made along the incision line which was on the left side of the neck. A marker was used to draw out the Modified Tyler incision line starting from the inferior portion of the ear, anterior to the inferior portion of the neck, down to the area of the sternocleidomastoid and following the sternocleidomastoid anteriorly and inferiorly towards the lower half of the neck.     A 15 blade was then used to make a the incision in this area, concentrating on   the lower half of the neck because this is where the mass was actually felt.   The incision was taken down through the subcuticular layer, down through the   small areas of the platysma into the area of the parotid and  the sternocleidomastoid. The sternocleidomastoid muscle was found and the anterior border of it was found and dissected away from the parotid gland itself. A skin flap  the parotid fascia from the subcutaneous tissue was then dissected out to expose the area where the mass was palpated to be. Great auricular nerve was found and dissected out.   All attempts were made to try to save it and it was sacrificed during the procedure.     Going in the lower half of the parotid tail inferiorly, the mass started to   present itself. The mass was found to be in its own plane, in its own   capsule. This capsule was dissected out and taken from an inferior to   superior-posterior direction. The entire mass seemed to be solid, did not   have any fluctuance to it. Using the harmonic scalpel and blunt dissection   as well as with peanuts, the mass was totally dissected from the parotid itself. One final clamp was placed and tied to hemostatically control the   area. Minimal bleeding was seen. The patient's nerve integrity monitor   nerve noted any signs that the surgical area was near the facial nerve,   especially the marginal mandibular area and this was left    intact. The mass was sent off for pathology.     A  drain was placed into the wound. The wound was irrigated out.  There was no bleeding seen. Bonny was applied to the wound base. The subcutaneous wound was closed with  3-0 vicryl suture in a simple interrupted fashion  and the skin was closed with   4-0 monocryl in a running subcuticular fashion followed by mastisol and steri strips . Fluffs and jobs dressing were placed over the face. The patient was returned to Anesthesia for appropriate awakening and taken to PACU in good condition without any complications.     Dr. Meehan was scrubbed for the entire case    Electronically signed by Jerome Garvin DO on 10/11/2024 at 10:12 AM

## 2024-10-11 NOTE — ANESTHESIA PRE PROCEDURE
Department of Anesthesiology  Preprocedure Note       Name:  Schuyler Altman   Age:  79 y.o.  :  1945                                          MRN:  [id number]         Date:  10/11/2024      Surgeon: Surgeon(s):  Kieran Meehan Jr., MD    Procedure: Procedure(s):  EXCISION LEFT PAROTID MASS (PATHOLOGY NOTIFIED)     +++NERVE INTEGRITY MONITOR+++    Medications prior to admission:   Prior to Admission medications    Medication Sig Start Date End Date Taking? Authorizing Provider   verapamil (CALAN SR) 240 MG extended release tablet  23  Yes Sheryl Okeefe MD   colchicine (MITIGARE) 0.6 MG capsule  23  Yes ProviderSheryl MD   celecoxib (CELEBREX) 200 MG capsule  23  Yes ProviderSheryl MD   omeprazole (PRILOSEC) 40 MG delayed release capsule Take 1 capsule by mouth daily as needed   Yes ProviderSheryl MD   olmesartan (BENICAR) 40 MG tablet Take 1 tablet by mouth at bedtime   Yes ProviderSheryl MD   atorvastatin (LIPITOR) 10 MG tablet Take 1 tablet by mouth at bedtime   Yes ProviderSheryl MD   tadalafil (CIALIS) 5 MG tablet Take by mouth  Patient not taking: Reported on 10/11/2024 12/7/21   ProviderSheryl MD       Current medications:    Current Facility-Administered Medications   Medication Dose Route Frequency Provider Last Rate Last Admin    lactated ringers IV soln infusion   IntraVENous Continuous Kieran Meehan Jr., MD        sodium chloride flush 0.9 % injection 5-40 mL  5-40 mL IntraVENous 2 times per day Kieran Meehan Jr., MD        sodium chloride flush 0.9 % injection 5-40 mL  5-40 mL IntraVENous PRN Kieran Meehan Jr., MD        0.9 % sodium chloride infusion   IntraVENous PRN Kieran Meehan Jr., MD        ceFAZolin (ANCEF) 2,000 mg in sterile water 20 mL IV syringe  2,000 mg IntraVENous On Call to OR Kieran Meehan Jr., MD           Allergies:  No Known Allergies    Problem List:    Patient Active Problem List   Diagnosis

## 2024-10-11 NOTE — H&P
The H&P has been reviewed, the patient has been examined, and I concur with the findings of the 10/1/2024 H&P.

## 2024-10-11 NOTE — ANESTHESIA POSTPROCEDURE EVALUATION
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    Vilma Phase I: Vilma Score: 10    Vilma Phase II: Vilma Score: 10    Anesthesia Post Evaluation    Patient location during evaluation: PACU  Patient participation: complete - patient participated  Level of consciousness: awake and alert  Airway patency: patent  Nausea & Vomiting: no nausea and no vomiting  Cardiovascular status: hemodynamically stable  Respiratory status: acceptable  Hydration status: euvolemic  Multimodal analgesia pain management approach  Pain management: adequate    No notable events documented.

## 2024-10-11 NOTE — PROGRESS NOTES
INTRAOPERATIVE CONSULTATION (with FROZEN SECTION)    Parotid mass - benign hemorrhagic cyst. Await permanent sections

## 2024-10-11 NOTE — PROGRESS NOTES
4 Eyes Skin Assessment     NAME:  Schuyler Altman  YOB: 1945  MEDICAL RECORD NUMBER:  [id number]    The patient is being assessed for  Admission    I agree that at least one RN has performed a thorough Head to Toe Skin Assessment on the patient. ALL assessment sites listed below have been assessed.      Areas assessed by both nurses:    Head, Face, Ears, Shoulders, Back, Chest, Arms, Elbows, Hands, Sacrum. Buttock, Coccyx, Ischium, Legs. Feet and Heels, and Under Medical Devices         Does the Patient have a Wound? No noted wound(s)       Fernando Prevention initiated by RN: No  Wound Care Orders initiated by RN: No    Pressure Injury (Stage 3,4, Unstageable, DTI, NWPT, and Complex wounds) if present, place Wound referral order by RN under : No    New Ostomies, if present place, Ostomy referral order under : No     Nurse 1 eSignature: Electronically signed by Amanda Deras RN on 10/11/24 at 6:55 PM EDT    **SHARE this note so that the co-signing nurse can place an eSignature**    Nurse 2 eSignature: Electronically signed by Petrona Stack RN on 10/12/24 at 3:02 AM EDT

## 2024-10-12 VITALS
OXYGEN SATURATION: 96 % | BODY MASS INDEX: 31.14 KG/M2 | SYSTOLIC BLOOD PRESSURE: 165 MMHG | TEMPERATURE: 97.5 F | HEIGHT: 73 IN | HEART RATE: 74 BPM | WEIGHT: 235 LBS | RESPIRATION RATE: 18 BRPM | DIASTOLIC BLOOD PRESSURE: 79 MMHG

## 2024-10-12 PROCEDURE — 6360000002 HC RX W HCPCS

## 2024-10-12 PROCEDURE — 2580000003 HC RX 258

## 2024-10-12 PROCEDURE — G0378 HOSPITAL OBSERVATION PER HR: HCPCS

## 2024-10-12 PROCEDURE — 6370000000 HC RX 637 (ALT 250 FOR IP)

## 2024-10-12 RX ADMIN — PANTOPRAZOLE SODIUM 40 MG: 40 TABLET, DELAYED RELEASE ORAL at 05:57

## 2024-10-12 RX ADMIN — ACETAMINOPHEN 650 MG: 325 TABLET ORAL at 02:27

## 2024-10-12 RX ADMIN — WATER 2000 MG: 1 INJECTION INTRAMUSCULAR; INTRAVENOUS; SUBCUTANEOUS at 02:07

## 2024-10-12 ASSESSMENT — PAIN SCALES - GENERAL
PAINLEVEL_OUTOF10: 0
PAINLEVEL_OUTOF10: 3

## 2024-10-12 ASSESSMENT — PAIN DESCRIPTION - LOCATION: LOCATION: NECK

## 2024-10-12 ASSESSMENT — PAIN DESCRIPTION - ORIENTATION: ORIENTATION: LEFT

## 2024-10-12 NOTE — DISCHARGE SUMMARY
Physician Discharge Summary     Patient ID:  Schuyler TURNER Agapito  [id number]  79 y.o.  1945    Admit date: 10/11/2024    Discharge date and time: No discharge date for patient encounter.     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 *      Admission Condition: good    Discharged Condition: stable    Indication for Admission: s/p parotidectomy    Hospital Course/Procedures/Operation/treatments:   10/11/24: Patient underwent left parotidectomy due to mass which was biopsied and suspicious for Warthin's tumor. Patient tolerated the procedure well and without complication. Patient admitted post-operatively due to persistent drowsiness.   10/12/24: No acute events overnight. 75 cc SS drainage in MARCO. Facial nerve is symmetric and intact. Ok for discharge to home with follow up on Monday for drain removal.            Consults:   None    Significant Diagnostic Studies:   No results found.    Discharge Exam:    GENERAL:  Awake, alert, cooperative, no apparent distress  HENT: Normocephalic, atraumatic. 75 cc SS from MARCO in 24 hours.  Steri-Strips are clean dry and intact.  No fluctuance. Mild weakness of the left marginal mandibular nerve (HB II/VI), facial nerve is otherwise intact  EYES: No sclera icterus, pupils equal  LUNGS:  No increased work of breathing, no stridor, on room air  CARDIOVASCULAR:  RR    Disposition: home    In process/preliminary results:  Outstanding Order Results       No orders found for last 30 day(s).            Patient Instructions:   Current Discharge Medication List             Details   HYDROcodone-acetaminophen (NORCO) 5-325 MG per tablet Take 1 tablet by mouth every 6 hours as needed for Pain for up to 3 days. Intended supply: 3 days. Take lowest dose possible to manage pain Max Daily Amount: 4  tablets  Qty: 12 tablet, Refills: 0    Comments: Reduce doses taken as pain becomes manageable  Associated Diagnoses: Post-op pain      ondansetron (ZOFRAN-ODT) 4 MG disintegrating tablet Take 1 tablet by mouth 3 times daily as needed for Nausea or Vomiting  Qty: 9 tablet, Refills: 0      cephALEXin (KEFLEX) 500 MG capsule Take 1 capsule by mouth 2 times daily for 7 days  Qty: 14 capsule, Refills: 0                Details   verapamil (CALAN SR) 240 MG extended release tablet       colchicine (MITIGARE) 0.6 MG capsule       celecoxib (CELEBREX) 200 MG capsule       omeprazole (PRILOSEC) 40 MG delayed release capsule Take 1 capsule by mouth daily as needed      olmesartan (BENICAR) 40 MG tablet Take 1 tablet by mouth at bedtime      atorvastatin (LIPITOR) 10 MG tablet Take 1 tablet by mouth at bedtime      tadalafil (CIALIS) 5 MG tablet Take by mouth             DISCHARGE INSTRUCTIONS    Call our office for any questions/concerns and for follow up appointment    Please follow the instructions checked below:    ACTIVITY INSTRUCTIONS:  [x]Increase activity as tolerated    [x]No heavy lifting or strenuous activity     [x]No driving while taking pain medication    WOUND/DRESSING INSTRUCTIONS:  [x]May shower      [x]No sitting in bath tub, hot tub or swimming.   [x]Ice to areas of pain for first 24 hours.      [x]Your wound was sealed with a coat mastisol and steri strips. All the sutures are underneath the skin. The steri strips will be removed in the office at your follow up appointment.       MEDICATION INSTRUCTIONS:  [x]Take medication as prescribed.  [x]When taking pain medications, you may experience dizziness or drowsiness.  Do not drink alcohol or drive when taking these medications.  [x]You may take Ibuprofen (over the counter) as per directions for mild pain.  [x]Do not take any other acetaminophen (Tylenol) products while taking your pain medication.  [x]You may experience constipation while taking pain

## 2024-10-12 NOTE — PLAN OF CARE
Problem: Discharge Planning  Goal: Discharge to home or other facility with appropriate resources  10/12/2024 0757 by Amanda Deras, RN  Outcome: Completed     Problem: Pain  Goal: Verbalizes/displays adequate comfort level or baseline comfort level  10/12/2024 0757 by Amanda Deras, RN  Outcome: Completed     Problem: Safety - Adult  Goal: Free from fall injury  10/12/2024 0757 by Amanda Deras, RN  Outcome: Completed     Problem: ABCDS Injury Assessment  Goal: Absence of physical injury  10/12/2024 0757 by Amanda Deras, RN  Outcome: Completed

## 2024-10-12 NOTE — PLAN OF CARE
Problem: Discharge Planning  Goal: Discharge to home or other facility with appropriate resources  Outcome: Progressing  Flowsheets (Taken 10/11/2024 1741 by Tammi Dumont, RN)  Discharge to home or other facility with appropriate resources: Refer to discharge planning if patient needs post-hospital services based on physician order or complex needs related to functional status, cognitive ability or social support system     Problem: Pain  Goal: Verbalizes/displays adequate comfort level or baseline comfort level  Outcome: Progressing     Problem: Safety - Adult  Goal: Free from fall injury  Outcome: Progressing     Problem: ABCDS Injury Assessment  Goal: Absence of physical injury  Outcome: Progressing

## 2024-10-12 NOTE — PROGRESS NOTES
Dr cain in to assess. Pt dressed and anxious for discharge. Denies any questions or concerns. Will go to office Monday for hanna removal. Verbalized an understanding of new medications

## 2024-10-12 NOTE — PROGRESS NOTES
OTOLARYNGOLOGY  DAILY PROGRESS NOTE  10/12/2024    Subjective:     Patient re-assessed.  Pain controlled with Tylenol. Vital signs with some elevated BP in the 150s/70s overnight.     Objective:     BP (!) 154/79   Pulse 68   Temp 97.6 °F (36.4 °C) (Oral)   Resp 16   Ht 1.854 m (6' 1\")   Wt 106.6 kg (235 lb)   SpO2 96%   BMI 31.00 kg/m²   PULSE OXIMETRY RANGE: SpO2  Av.4 %  Min: 91 %  Max: 98 %  I/O last 3 completed shifts:  In: 200 [I.V.:200]  Out:  [Urine:2000; Drains:75; Blood:20]    GENERAL:  Awake, alert, cooperative, no apparent distress  HENT: Normocephalic, atraumatic. 75 cc SS from MARCO in 24 hours.  Steri-Strips are clean dry and intact.  No fluctuance. Mild weakness of the left marginal mandibular nerve (HB II/VI), facial nerve is otherwise intact  EYES: No sclera icterus, pupils equal  LUNGS:  No increased work of breathing, no stridor, on room air  CARDIOVASCULAR:  RR      Assessment/Plan:     79 y.o. 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     Patient is okay for discharge to home  Follow-up on Monday for drain removal  Antibiotics with drains in place  Okay to remove Jobst dressing but patient instructed to take this home with him  Contact on-call ENT resident with any further questions or concerns    Patient seen and examined by resident with attending, Dr. Meehan.    Electronically signed by Darian Carvajal DO on 10/12/2024 at 7:08 AM

## 2024-10-16 LAB — SURGICAL PATHOLOGY REPORT: NORMAL

## 2024-11-27 ENCOUNTER — OFFICE VISIT (OUTPATIENT)
Dept: CARDIOLOGY CLINIC | Age: 79
End: 2024-11-27

## 2024-11-27 VITALS
BODY MASS INDEX: 32.79 KG/M2 | RESPIRATION RATE: 18 BRPM | TEMPERATURE: 97.5 F | HEIGHT: 73 IN | HEART RATE: 78 BPM | OXYGEN SATURATION: 95 % | DIASTOLIC BLOOD PRESSURE: 60 MMHG | SYSTOLIC BLOOD PRESSURE: 142 MMHG | WEIGHT: 247.4 LBS

## 2024-11-27 DIAGNOSIS — I51.9 LEFT VENTRICULAR DIASTOLIC DYSFUNCTION: ICD-10-CM

## 2024-11-27 DIAGNOSIS — I10 PRIMARY HYPERTENSION: ICD-10-CM

## 2024-11-27 DIAGNOSIS — I77.89 AORTIC ROOT ENLARGEMENT (HCC): Primary | ICD-10-CM

## 2024-11-27 DIAGNOSIS — I25.10 CORONARY ARTERY CALCIFICATION: ICD-10-CM

## 2024-11-27 DIAGNOSIS — I51.7 LVH (LEFT VENTRICULAR HYPERTROPHY): ICD-10-CM

## 2024-11-27 NOTE — PROGRESS NOTES
Patient Active Problem List   Diagnosis    Hypertension    Left ventricular diastolic dysfunction    LVH (left ventricular hypertrophy)    Pneumonia due to COVID-19 virus    Parotid mass       Current Outpatient Medications   Medication Sig Dispense Refill    verapamil (CALAN SR) 240 MG extended release tablet 1 tablet daily      colchicine (MITIGARE) 0.6 MG capsule Take 1 capsule by mouth daily      omeprazole (PRILOSEC) 40 MG delayed release capsule Take 1 capsule by mouth daily as needed      olmesartan (BENICAR) 40 MG tablet Take 1 tablet by mouth at bedtime      atorvastatin (LIPITOR) 10 MG tablet Take 1 tablet by mouth at bedtime      tadalafil (CIALIS) 5 MG tablet Take by mouth (Patient not taking: Reported on 11/27/2024)      celecoxib (CELEBREX) 200 MG capsule  (Patient not taking: Reported on 11/27/2024)       No current facility-administered medications for this visit.       CC:    Patient is seen in follow up for:  1. Aortic root enlargement (HCC)    2. Primary hypertension    3. LVH (left ventricular hypertrophy)    4. Left ventricular diastolic dysfunction    5. Coronary artery calcification        HPI:  Patient is doing well without any specific cardiac problems.  Patient denies any shortness of breath, chest pain, lightheadedness or dizziness.  Patient is tolerating medications well without side effects.      ROS:   General: No unusual weight gain, no change in exercise tolerance  Skin: No rash or itching  EENT: No vision changes or nosebleeds  Cardiovascular: No orthopnea or paroxysmal nocturnal dyspnea  Respiratory: No cough or hemoptysis  Gastrointestinal: No hematemesis or recent changes in bowel habits  Genitourinary: No hematuria, urgency or frequency  Musculoskeletal: No muscular weakness or joint swelling   Neurologic / Psychiatric: No incoordination or convulsions  Allergic / Immunologic/ Lymphatic / Endocrine: No anemia or bleeding tendency    Social History     Socioeconomic History

## 2024-12-19 ENCOUNTER — HOSPITAL ENCOUNTER (OUTPATIENT)
Dept: CARDIOLOGY | Age: 79
Discharge: HOME | End: 2024-12-21
Attending: INTERNAL MEDICINE
Payer: MEDICARE

## 2024-12-19 VITALS
WEIGHT: 247 LBS | DIASTOLIC BLOOD PRESSURE: 60 MMHG | BODY MASS INDEX: 32.74 KG/M2 | SYSTOLIC BLOOD PRESSURE: 142 MMHG | HEIGHT: 73 IN

## 2024-12-19 DIAGNOSIS — I77.89 AORTIC ROOT ENLARGEMENT (HCC): ICD-10-CM

## 2024-12-19 PROCEDURE — 93306 TTE W/DOPPLER COMPLETE: CPT

## 2024-12-20 ENCOUNTER — TELEPHONE (OUTPATIENT)
Dept: CARDIOLOGY CLINIC | Age: 79
End: 2024-12-20

## 2024-12-20 LAB
ECHO AO ASC DIAM: 3.3 CM
ECHO AO ASCENDING AORTA INDEX: 1.4 CM/M2
ECHO AV AREA PEAK VELOCITY: 2.2 CM2
ECHO AV AREA VTI: 2.6 CM2
ECHO AV AREA/BSA PEAK VELOCITY: 0.9 CM2/M2
ECHO AV AREA/BSA VTI: 1.1 CM2/M2
ECHO AV CUSP MM: 2 CM
ECHO AV MEAN GRADIENT: 7 MMHG
ECHO AV MEAN VELOCITY: 1.2 M/S
ECHO AV PEAK GRADIENT: 14 MMHG
ECHO AV PEAK VELOCITY: 1.9 M/S
ECHO AV VELOCITY RATIO: 0.53
ECHO AV VTI: 37.1 CM
ECHO BSA: 2.4 M2
ECHO EST RA PRESSURE: 3 MMHG
ECHO LA DIAMETER INDEX: 1.45 CM/M2
ECHO LA DIAMETER: 3.4 CM
ECHO LA VOL A-L A2C: 81 ML (ref 18–58)
ECHO LA VOL A-L A4C: 81 ML (ref 18–58)
ECHO LA VOL MOD A2C: 77 ML (ref 18–58)
ECHO LA VOL MOD A4C: 75 ML (ref 18–58)
ECHO LA VOLUME AREA LENGTH: 82 ML
ECHO LA VOLUME INDEX A-L A2C: 34 ML/M2 (ref 16–34)
ECHO LA VOLUME INDEX A-L A4C: 34 ML/M2 (ref 16–34)
ECHO LA VOLUME INDEX AREA LENGTH: 35 ML/M2 (ref 16–34)
ECHO LA VOLUME INDEX MOD A2C: 33 ML/M2 (ref 16–34)
ECHO LA VOLUME INDEX MOD A4C: 32 ML/M2 (ref 16–34)
ECHO LV EF PHYSICIAN: 60 %
ECHO LV FRACTIONAL SHORTENING: 30 % (ref 28–44)
ECHO LV INTERNAL DIMENSION DIASTOLE INDEX: 1.83 CM/M2
ECHO LV INTERNAL DIMENSION DIASTOLIC: 4.3 CM (ref 4.2–5.9)
ECHO LV INTERNAL DIMENSION SYSTOLIC INDEX: 1.28 CM/M2
ECHO LV INTERNAL DIMENSION SYSTOLIC: 3 CM
ECHO LV ISOVOLUMETRIC RELAXATION TIME (IVRT): 65.7 MS
ECHO LV IVSD: 1.4 CM (ref 0.6–1)
ECHO LV IVSS: 1.6 CM
ECHO LV MASS 2D: 207.8 G (ref 88–224)
ECHO LV MASS INDEX 2D: 88.4 G/M2 (ref 49–115)
ECHO LV POSTERIOR WALL DIASTOLIC: 1.2 CM (ref 0.6–1)
ECHO LV POSTERIOR WALL SYSTOLIC: 1.4 CM
ECHO LV RELATIVE WALL THICKNESS RATIO: 0.56
ECHO LVOT AREA: 4.2 CM2
ECHO LVOT AV VTI INDEX: 0.63
ECHO LVOT DIAM: 2.3 CM
ECHO LVOT MEAN GRADIENT: 2 MMHG
ECHO LVOT PEAK GRADIENT: 4 MMHG
ECHO LVOT PEAK VELOCITY: 1 M/S
ECHO LVOT STROKE VOLUME INDEX: 41 ML/M2
ECHO LVOT SV: 96.3 ML
ECHO LVOT VTI: 23.2 CM
ECHO MV "A" WAVE DURATION: 148.8 MSEC
ECHO MV A VELOCITY: 0.87 M/S
ECHO MV AREA PHT: 2.2 CM2
ECHO MV AREA VTI: 3.6 CM2
ECHO MV E DECELERATION TIME (DT): 267 MS
ECHO MV E VELOCITY: 0.82 M/S
ECHO MV E/A RATIO: 0.94
ECHO MV LVOT VTI INDEX: 1.15
ECHO MV MAX VELOCITY: 0.9 M/S
ECHO MV MEAN GRADIENT: 1 MMHG
ECHO MV MEAN VELOCITY: 0.5 M/S
ECHO MV PEAK GRADIENT: 3 MMHG
ECHO MV PRESSURE HALF TIME (PHT): 98.5 MS
ECHO MV VTI: 26.6 CM
ECHO PV MAX VELOCITY: 1 M/S
ECHO PV MEAN GRADIENT: 2 MMHG
ECHO PV MEAN VELOCITY: 0.6 M/S
ECHO PV PEAK GRADIENT: 4 MMHG
ECHO PV VTI: 19.1 CM
ECHO RV INTERNAL DIMENSION: 2.8 CM

## 2024-12-20 NOTE — TELEPHONE ENCOUNTER
----- Message from Dr. Octavio Chandler MD sent at 12/20/2024  3:14 PM EST -----  Discussed results by phone

## 2025-07-16 ENCOUNTER — HOSPITAL ENCOUNTER (OUTPATIENT)
Dept: GENERAL RADIOLOGY | Age: 80
Discharge: HOME | End: 2025-07-18
Payer: MEDICARE

## 2025-07-16 ENCOUNTER — TRANSCRIBE ORDERS (OUTPATIENT)
Dept: GENERAL RADIOLOGY | Age: 80
End: 2025-07-16

## 2025-07-16 DIAGNOSIS — M54.50 LOW BACK PAIN, UNSPECIFIED BACK PAIN LATERALITY, UNSPECIFIED CHRONICITY, UNSPECIFIED WHETHER SCIATICA PRESENT: ICD-10-CM

## 2025-07-16 DIAGNOSIS — M54.50 LOW BACK PAIN, UNSPECIFIED BACK PAIN LATERALITY, UNSPECIFIED CHRONICITY, UNSPECIFIED WHETHER SCIATICA PRESENT: Primary | ICD-10-CM

## 2025-07-16 PROCEDURE — 72110 X-RAY EXAM L-2 SPINE 4/>VWS: CPT

## (undated) DEVICE — 4-PORT MANIFOLD: Brand: NEPTUNE 2

## (undated) DEVICE — SUPPORT FACE L FOR 27IN EAR CHEEK CHIN E FOR FACIOPLASTY

## (undated) DEVICE — SKN PREP SPNG STKS PVP PNT STR: Brand: MEDLINE INDUSTRIES, INC.

## (undated) DEVICE — SHEARS ENDOSCP L9CM CRV HARM FOCS +

## (undated) DEVICE — JP CHAN DRN SIL RND 15FR FULL W/TRO: Brand: JACKSON-PRATT

## (undated) DEVICE — Device

## (undated) DEVICE — YANKAUER,OPEN TIP,W/O VENT,STERILE: Brand: MEDLINE INDUSTRIES, INC.

## (undated) DEVICE — 1000 S-DRAPE TOWEL DRAPE 10/BX: Brand: STERI-DRAPE™

## (undated) DEVICE — GLOVE ORANGE PI 7 1/2   MSG9075

## (undated) DEVICE — MARKER,SKIN,WI/RULER AND LABELS: Brand: MEDLINE

## (undated) DEVICE — PAD,NON-ADHERENT,2X3,STERILE,LF,1/PK: Brand: MEDLINE

## (undated) DEVICE — SPONGE,PEANUT,XRAY,ST,SM,3/8",5/CARD: Brand: MEDLINE INDUSTRIES, INC.

## (undated) DEVICE — TOWEL SURG SM W12XL18IN CLR PLAS TEAR RESIST REINF ADH FRST

## (undated) DEVICE — BLADE,STAINLESS-STEEL,15,STRL,DISPOSABLE: Brand: MEDLINE

## (undated) DEVICE — SYSTEM SURG HEMSTAT PWD 1 GM POLYSACCHARIDE HEMOSPHERES

## (undated) DEVICE — SYRINGE MED 10ML TRNSLUC BRL PLUNG BLK MRK POLYPR CTRL

## (undated) DEVICE — NEEDLE HYPO 25GA L1.5IN BLU POLYPR HUB S STL REG BVL STR

## (undated) DEVICE — MASTISOL ADHESIVE LIQ 2/3ML

## (undated) DEVICE — BLADE ES ELASTOMERIC COAT INSUL DURABLE BEND UPTO 90DEG

## (undated) DEVICE — DOUBLE BASIN SET: Brand: MEDLINE INDUSTRIES, INC.

## (undated) DEVICE — EXTRACTOR STPL L10CM REMV SKIN CLSR STPL SQUEEZE HNDL PROX

## (undated) DEVICE — GAUZE,SPONGE,4"X4",16PLY,XRAY,STRL,LF: Brand: MEDLINE

## (undated) DEVICE — DRAIN SURG PENROSE 0.5X12 IN PREM SIL STRL

## (undated) DEVICE — GAUZE,SPONGE,4"X4",8PLY,STRL,LF,10/TRAY: Brand: MEDLINE

## (undated) DEVICE — PROBE 8225101 5PK STD PRASS FL TIP ROHS

## (undated) DEVICE — ELECTRODE PT RET AD L9FT HI MOIST COND ADH HYDRGEL CORDED

## (undated) DEVICE — MAGNETIC INSTR DRAPE 20X16: Brand: MEDLINE INDUSTRIES, INC.

## (undated) DEVICE — TOWEL,OR,DSP,ST,BLUE,STD,6/PK,12PK/CS: Brand: MEDLINE

## (undated) DEVICE — COTTON STRIP: Brand: DEROYAL

## (undated) DEVICE — SPONGE,LAP,4"X18",XR,ST,5/PK,40PK/CS: Brand: MEDLINE INDUSTRIES, INC.

## (undated) DEVICE — JELLY,LUBE,STERILE,FLIP TOP,TUBE,2-OZ: Brand: MEDLINE

## (undated) DEVICE — STRIP,CLOSURE,WOUND,MEDI-STRIP,1/2X4: Brand: MEDLINE

## (undated) DEVICE — ELECTRODE ELECSURG NDL 2.8 INX7.2 CM COAT INSUL EDGE

## (undated) DEVICE — CORD,CAUTERY,BIPOLAR,STERILE: Brand: MEDLINE